# Patient Record
Sex: FEMALE | Race: WHITE | Employment: UNEMPLOYED | URBAN - METROPOLITAN AREA
[De-identification: names, ages, dates, MRNs, and addresses within clinical notes are randomized per-mention and may not be internally consistent; named-entity substitution may affect disease eponyms.]

---

## 2017-03-31 ENCOUNTER — HOSPITAL ENCOUNTER (OUTPATIENT)
Dept: MAMMOGRAPHY | Age: 47
Discharge: HOME | End: 2017-03-31
Attending: FAMILY MEDICINE
Payer: COMMERCIAL

## 2017-03-31 DIAGNOSIS — Z12.31 VISIT FOR SCREENING MAMMOGRAM: ICD-10-CM

## 2017-03-31 PROCEDURE — 77067 SCR MAMMO BI INCL CAD: CPT

## 2017-04-06 ENCOUNTER — HOSPITAL ENCOUNTER (OUTPATIENT)
Dept: MAMMOGRAPHY | Age: 47
Discharge: HOME | End: 2017-04-06
Attending: FAMILY MEDICINE
Payer: COMMERCIAL

## 2017-04-06 DIAGNOSIS — R92.8 ABNORMAL SCREENING MAMMOGRAM: ICD-10-CM

## 2017-04-06 PROCEDURE — 76642 ULTRASOUND BREAST LIMITED: CPT

## 2017-04-06 PROCEDURE — 77066 DX MAMMO INCL CAD BI: CPT

## 2017-04-27 PROBLEM — E11.9 NEW ONSET TYPE 2 DIABETES MELLITUS (HCC): Status: ACTIVE | Noted: 2017-04-27

## 2017-04-28 PROBLEM — I10 SEVERE UNCONTROLLED HYPERTENSION: Status: ACTIVE | Noted: 2017-04-28

## 2017-09-18 PROBLEM — E11.42 DM TYPE 2 WITH DIABETIC PERIPHERAL NEUROPATHY (HCC): Status: ACTIVE | Noted: 2017-09-18

## 2017-09-18 PROBLEM — M17.0 ARTHRITIS OF BOTH KNEES: Status: ACTIVE | Noted: 2017-09-18

## 2017-10-15 ENCOUNTER — HOSPITAL ENCOUNTER (OUTPATIENT)
Dept: SLEEP MEDICINE | Age: 47
Discharge: HOME | End: 2017-10-15
Payer: COMMERCIAL

## 2017-10-15 PROCEDURE — 95810 POLYSOM 6/> YRS 4/> PARAM: CPT

## 2017-12-01 PROBLEM — E66.01 OBESITY, MORBID (HCC): Status: ACTIVE | Noted: 2017-12-01

## 2017-12-01 PROBLEM — E11.9 TYPE 2 DIABETES MELLITUS WITHOUT COMPLICATION, WITHOUT LONG-TERM CURRENT USE OF INSULIN (HCC): Status: ACTIVE | Noted: 2017-12-01

## 2018-01-30 PROBLEM — F33.41 RECURRENT MAJOR DEPRESSIVE DISORDER, IN PARTIAL REMISSION (HCC): Status: ACTIVE | Noted: 2018-01-30

## 2018-02-23 PROBLEM — E11.21 TYPE 2 DIABETES WITH NEPHROPATHY (HCC): Status: ACTIVE | Noted: 2018-02-23

## 2018-08-10 ENCOUNTER — HOSPITAL ENCOUNTER (OUTPATIENT)
Dept: MAMMOGRAPHY | Age: 48
Discharge: HOME | End: 2018-08-10
Attending: FAMILY MEDICINE
Payer: COMMERCIAL

## 2018-08-10 DIAGNOSIS — Z12.31 VISIT FOR SCREENING MAMMOGRAM: ICD-10-CM

## 2018-08-10 PROCEDURE — 77067 SCR MAMMO BI INCL CAD: CPT

## 2019-04-01 ENCOUNTER — HOSPITAL ENCOUNTER (OUTPATIENT)
Dept: SURGERY | Age: 49
Discharge: HOME | End: 2019-04-01

## 2019-04-03 NOTE — PERIOP NOTES
Message left with Dr Zhang's surgery scheduler regarding inability to reach patient for phone assessment.

## 2019-08-12 ENCOUNTER — HOSPITAL ENCOUNTER (OUTPATIENT)
Dept: MAMMOGRAPHY | Age: 49
Discharge: HOME | End: 2019-08-12
Attending: INTERNAL MEDICINE

## 2019-08-12 DIAGNOSIS — Z12.39 SCREENING FOR MALIGNANT NEOPLASM OF BREAST: ICD-10-CM

## 2019-08-12 DIAGNOSIS — Z12.39 BREAST SCREENING, UNSPECIFIED: ICD-10-CM

## 2019-11-24 ENCOUNTER — APPOINTMENT (OUTPATIENT)
Dept: GENERAL RADIOLOGY | Age: 49
End: 2019-11-24
Attending: EMERGENCY MEDICINE
Payer: COMMERCIAL

## 2019-11-24 ENCOUNTER — HOSPITAL ENCOUNTER (EMERGENCY)
Age: 49
Discharge: HOME | End: 2019-11-24
Attending: EMERGENCY MEDICINE
Payer: COMMERCIAL

## 2019-11-24 VITALS
RESPIRATION RATE: 18 BRPM | OXYGEN SATURATION: 98 % | HEART RATE: 117 BPM | DIASTOLIC BLOOD PRESSURE: 85 MMHG | BODY MASS INDEX: 57.52 KG/M2 | SYSTOLIC BLOOD PRESSURE: 127 MMHG | TEMPERATURE: 97.8 F | HEIGHT: 60 IN | WEIGHT: 293 LBS

## 2019-11-24 DIAGNOSIS — S61.411A LACERATION OF RIGHT HAND WITHOUT FOREIGN BODY, INITIAL ENCOUNTER: Primary | ICD-10-CM

## 2019-11-24 PROCEDURE — 77030039266 HC ADH SKN EXOFIN S2SG -A

## 2019-11-24 PROCEDURE — 75810000293 HC SIMP/SUPERF WND  RPR: Performed by: EMERGENCY MEDICINE

## 2019-11-24 PROCEDURE — 73130 X-RAY EXAM OF HAND: CPT

## 2019-11-24 PROCEDURE — 99283 EMERGENCY DEPT VISIT LOW MDM: CPT | Performed by: EMERGENCY MEDICINE
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
## 2019-11-25 NOTE — DISCHARGE INSTRUCTIONS
Return with any fevers, drainage worsening symptoms, or additional concerns. The glue should fall off in about 7-10 days. Do not apply any ointment to the wound until the glue has fallen off; it will make it fall off too soon.

## 2019-11-25 NOTE — ED NOTES
I have reviewed discharge instructions with the patient. The patient verbalized understanding. Patient left ED via Discharge Method: ambulatory to Home with self. Opportunity for questions and clarification provided. Patient given 0 scripts. To continue your aftercare when you leave the hospital, you may receive an automated call from our care team to check in on how you are doing. This is a free service and part of our promise to provide the best care and service to meet your aftercare needs.  If you have questions, or wish to unsubscribe from this service please call [phone]. Thank you for Choosing our [facility] Emergency Department.

## 2020-08-04 PROCEDURE — 88305 TISSUE EXAM BY PATHOLOGIST: CPT

## 2020-08-05 ENCOUNTER — HOSPITAL ENCOUNTER (OUTPATIENT)
Dept: LAB | Age: 50
Discharge: HOME | End: 2020-08-05

## 2020-09-16 ENCOUNTER — HOSPITAL ENCOUNTER (OUTPATIENT)
Dept: MAMMOGRAPHY | Age: 50
Discharge: HOME | End: 2020-09-16
Attending: INTERNAL MEDICINE
Payer: COMMERCIAL

## 2020-09-16 DIAGNOSIS — Z12.31 SCREENING MAMMOGRAM FOR HIGH-RISK PATIENT: ICD-10-CM

## 2020-09-16 PROCEDURE — 77063 BREAST TOMOSYNTHESIS BI: CPT

## 2022-03-19 PROBLEM — E11.9 NEW ONSET TYPE 2 DIABETES MELLITUS (HCC): Status: ACTIVE | Noted: 2017-04-27

## 2022-03-19 PROBLEM — E11.21 TYPE 2 DIABETES WITH NEPHROPATHY (HCC): Status: ACTIVE | Noted: 2018-02-23

## 2022-03-19 PROBLEM — M17.0 ARTHRITIS OF BOTH KNEES: Status: ACTIVE | Noted: 2017-09-18

## 2022-03-19 PROBLEM — E11.42 DM TYPE 2 WITH DIABETIC PERIPHERAL NEUROPATHY (HCC): Status: ACTIVE | Noted: 2017-09-18

## 2022-03-19 PROBLEM — F33.41 RECURRENT MAJOR DEPRESSIVE DISORDER, IN PARTIAL REMISSION (HCC): Status: ACTIVE | Noted: 2018-01-30

## 2022-03-19 PROBLEM — E66.01 OBESITY, MORBID (HCC): Status: ACTIVE | Noted: 2017-12-01

## 2022-03-20 PROBLEM — E11.9 TYPE 2 DIABETES MELLITUS WITHOUT COMPLICATION, WITHOUT LONG-TERM CURRENT USE OF INSULIN (HCC): Status: ACTIVE | Noted: 2017-12-01

## 2022-03-20 PROBLEM — I10 SEVERE UNCONTROLLED HYPERTENSION: Status: ACTIVE | Noted: 2017-04-28

## 2022-06-02 ENCOUNTER — TELEPHONE (OUTPATIENT)
Dept: ORTHOPEDIC SURGERY | Age: 52
End: 2022-06-02

## 2022-06-02 NOTE — TELEPHONE ENCOUNTER
Called pt as I noticed she cancelled her apt. Pt stated her foot is feeling better and she is still wearing her carbon fiber insert. Her main concern right now is her knees and she does have an apt with Dr. [employer] coming up. Kellee Knight

## 2022-06-20 ENCOUNTER — OFFICE VISIT (OUTPATIENT)
Dept: ORTHOPEDIC SURGERY | Age: 52
End: 2022-06-20
Payer: COMMERCIAL

## 2022-06-20 VITALS — BODY MASS INDEX: 59.07 KG/M2 | HEIGHT: 59 IN | WEIGHT: 293 LBS

## 2022-06-20 DIAGNOSIS — E66.01 MORBID OBESITY (HCC): ICD-10-CM

## 2022-06-20 DIAGNOSIS — M17.12 PRIMARY OSTEOARTHRITIS OF LEFT KNEE: Primary | ICD-10-CM

## 2022-06-20 PROCEDURE — 99214 OFFICE O/P EST MOD 30 MIN: CPT | Performed by: ORTHOPAEDIC SURGERY

## 2022-06-20 PROCEDURE — 20610 DRAIN/INJ JOINT/BURSA W/O US: CPT | Performed by: ORTHOPAEDIC SURGERY

## 2022-06-20 RX ORDER — METHYLPREDNISOLONE ACETATE 40 MG/ML
40 INJECTION, SUSPENSION INTRA-ARTICULAR; INTRALESIONAL; INTRAMUSCULAR; SOFT TISSUE ONCE
Status: COMPLETED | OUTPATIENT
Start: 2022-06-20 | End: 2022-06-20

## 2022-06-20 RX ORDER — DICLOFENAC SODIUM 75 MG/1
75 TABLET, DELAYED RELEASE ORAL 2 TIMES DAILY
Qty: 60 TABLET | Refills: 0 | Status: SHIPPED | OUTPATIENT
Start: 2022-06-20

## 2022-06-20 RX ADMIN — METHYLPREDNISOLONE ACETATE 40 MG: 40 INJECTION, SUSPENSION INTRA-ARTICULAR; INTRALESIONAL; INTRAMUSCULAR; SOFT TISSUE at 09:59

## 2022-06-20 NOTE — PROGRESS NOTES
Name: Lynnea Buerger  YOB: 1970  Gender: female  MRN: [id number]    CC: Left knee pain    HPI: Lynnea Buerger is a 46 y.o. female who presents with left knee pain. She has been seen in our practice for her foot and those notes were reviewed. She describes a episode in February in which she fell twisting her left knee and injuring her left foot. She did see Dr. Chet Junior at [facility] clinic had a diagnosis of a foot injury but also had knee injections recommended. She did not proceed with those having a desire to be followed in our practice. She did see Dr. Ave Jefferson for her foot and subsequently was scheduled to see me for her left knee pain. She does have a diagnosis of super morbid obesity and ambulates using a Rollator walker or with a cane most of the time. She states that in her home she can ambulate for short distances without any assistive device. She has had no recent injections in either knee and essentially manages with her pain under the care of her primary care provider. She has not looked for an orthopedic evaluation until the February episode. She has had no previous injections by her report. History was obtained from patient and spouse    ROS/Meds/PSH/PMH/FH/SH: I personally reviewed the patients standard intake form. Below are the pertinents    Tobacco:  reports that she has never smoked.  She has never used smokeless tobacco.  Past Medical History:   Diagnosis Date    Arthritis     knees osteo    HTN (hypertension) 2014    Hypertension     some what controlled by medication    Morbid obesity ([address]     BMI70.6    MRSA (methicillin resistant staph aureus) culture positive     New onset type 2 diabetes mellitus ([address] 2017    Recurrent major depressive disorder, in partial remission ([address] 2018    SOB (shortness of breath) on exertion       Past Surgical History:   Procedure Laterality Date     SECTION      ORTHOPEDIC SURGERY 2007    bone spur L foot    OTHER SURGICAL HISTORY      Right axilla I&D which tested positive for MRSA    TONSILLECTOMY  as a child    TUBAL LIGATION  2002        Physical Examination:  Physical exam: On examination of the patient's gait there is Significant antalgia in stance but she is able to arise from a seated position without assistance. She does come into the office today using one of our extra-large wheelchairs. Her BMI is measured to be over 74 today. On examination while standing there is decreased flexion in the lumbosacral spine without acute list or spasm. While seated ,  the Bilateral hip is examined there is full range of motion without obvious issue . On examination of the  Left knee :ROM is 15 to 100 There is significant tenderness to direct palpation, There is no obvious effusion and The extensor mechanism is intact. . On examination of the  Right knee : There is no obvious effusion. Lower extremities have a size and appearance consistent with her BMI. She does carry her weight diffusely in her thighs calves are commiserate with her BMI and body habitus. Patient is neurologically intact distally. Skin is intact. Imaging:   AP and lateral of the left knee reviewed independently by me having obtained elsewhere. These demonstrate relatively significant in joint disease in the knee without acute findings. There is proximal osteophyte formation. Assessment:   Degenerative joint disease of the left Knee. This is an exacerbation following a twisting episode in a super morbidly obese patient. I did discuss with the patient the source of the pain and treatment options. We discussed nonsurgical measures including:Injection therapy, Weight Loss and Use of assistive device. I counseled the patient regarding the difference between various injection therapies.   I explained the role of steroid injections and focused on the use of steroids for more acute issues and flareups times daily, Disp: , Rfl:     dilTIAZem (TIAZAC) 240 MG extended release capsule, TAKE ONE CAPSULE BY MOUTH EVERY DAY, Disp: , Rfl:     glimepiride (AMARYL) 2 MG tablet, Take 2 mg by mouth, Disp: , Rfl:     HYDROcodone-acetaminophen (NORCO) 7.5-325 MG per tablet, Take 1 tablet by mouth every 6 hours as needed. , Disp: , Rfl:     Lidocaine, Anorectal, 5 % CREA, Actual prescription: Lidocaine 5%: Neurontin/gabapentin 5% combined topical cream/gelApply to affected area up to 4 times a day as needed for pain, Disp: , Rfl:     lisinopril-hydroCHLOROthiazide (PRINZIDE;ZESTORETIC) 20-12.5 MG per tablet, Take 1 Tab by mouth two (2) times a day., Disp: , Rfl:     metFORMIN (GLUCOPHAGE-XR) 500 MG extended release tablet, Take 500 mg by mouth 2 times daily, Disp: , Rfl:     pioglitazone (ACTOS) 30 MG tablet, Take 30 mg by mouth daily, Disp: , Rfl:     traMADol (ULTRAM) 50 MG tablet, Take 50 mg by mouth every 8 hours as needed. , Disp: , Rfl:   Allergies   Allergen Reactions    Penicillins Anaphylaxis     \"Tolerates ampicillin and keflex without problems\"    Sulfa Antibiotics Swelling       CC:left knee pain    Impression: Osteoarthritis of the left knee    Procedure: Depomedrol injection     Procedure Note: The left knee was prepped with alcohol. Then the left knee was injected with 1 mL of 0.5% Marcaine and 40mg of Depo-Medrol. The patient tolerated the procedure without difficulty.       Yuliet Walsh MD

## 2022-07-11 ENCOUNTER — OFFICE VISIT (OUTPATIENT)
Dept: ORTHOPEDIC SURGERY | Age: 52
End: 2022-07-11
Payer: COMMERCIAL

## 2022-07-11 DIAGNOSIS — M25.561 ACUTE PAIN OF RIGHT KNEE: ICD-10-CM

## 2022-07-11 DIAGNOSIS — M17.11 PRIMARY OSTEOARTHRITIS OF RIGHT KNEE: ICD-10-CM

## 2022-07-11 DIAGNOSIS — M17.12 PRIMARY OSTEOARTHRITIS OF LEFT KNEE: Primary | ICD-10-CM

## 2022-07-11 PROCEDURE — 20611 DRAIN/INJ JOINT/BURSA W/US: CPT | Performed by: PHYSICIAN ASSISTANT

## 2022-07-11 RX ORDER — HYALURONATE SODIUM 10 MG/ML
20 SYRINGE (ML) INTRAARTICULAR ONCE
Status: COMPLETED | OUTPATIENT
Start: 2022-07-11 | End: 2022-07-11

## 2022-07-11 RX ADMIN — Medication 20 MG: at 15:38

## 2022-07-11 RX ADMIN — Medication 20 MG: at 15:37

## 2022-07-11 NOTE — PROGRESS NOTES
Name: Morales Keating  YOB: 1970  Gender: female  MRN: [id number]     CC: BILATERAL Knee Pain     PROCEDURE: #1 of 3 Hyaluronic Injection    After discussion of risks and benefits including but not limited to pain, infection, skin discoloration, and injury to blood vessels or nerves the patient verbally consented to proceed with injection of the BILATERAL. The patient is to restrict their activity for 48 hours. Radiology Report: US guidance was used to examine the joint, ensure adequate needle placement and to decrease the risk of joint aggravation. The intracondylar notch, retropatellar fat pad, patella tendon, patella and tibia were all visualized. Pre and post injection US still images were obtained and placed in the record. Image were obtained with a [employer] ultrasound transducer (model [id number]). Procedure Note: After steriley prepping the BILATERAL knee, it was injected with a 2mL of Euflexxa and the medication was observed going into the intra-articular space via US guidance. The patient tolerated the procedure without difficulty.     CARLOS Sorensen

## 2022-07-18 ENCOUNTER — OFFICE VISIT (OUTPATIENT)
Dept: ORTHOPEDIC SURGERY | Age: 52
End: 2022-07-18
Payer: COMMERCIAL

## 2022-07-18 DIAGNOSIS — M17.12 PRIMARY OSTEOARTHRITIS OF LEFT KNEE: ICD-10-CM

## 2022-07-18 DIAGNOSIS — M17.11 PRIMARY OSTEOARTHRITIS OF RIGHT KNEE: Primary | ICD-10-CM

## 2022-07-18 PROCEDURE — 20611 DRAIN/INJ JOINT/BURSA W/US: CPT | Performed by: PHYSICIAN ASSISTANT

## 2022-07-18 RX ORDER — HYALURONATE SODIUM 10 MG/ML
20 SYRINGE (ML) INTRAARTICULAR ONCE
Status: COMPLETED | OUTPATIENT
Start: 2022-07-18 | End: 2022-07-18

## 2022-07-18 RX ADMIN — Medication 20 MG: at 13:42

## 2022-07-18 RX ADMIN — Medication 20 MG: at 13:43

## 2022-07-18 NOTE — PROGRESS NOTES
Name: Mihir Jackson  YOB: 1970  Gender: female  MRN: [id number]     CC: BILATERAL Knee Pain     PROCEDURE: #2 of 3 Hyaluronic Injection    After discussion of risks and benefits including but not limited to pain, infection, skin discoloration, and injury to blood vessels or nerves the patient verbally consented to proceed with injection of the BILATERAL. The patient is to restrict their activity for 48 hours. Radiology Report: US guidance was used to examine the joint, ensure adequate needle placement and to decrease the risk of joint aggravation. The intracondylar notch, retropatellar fat pad, patella tendon, patella and tibia were all visualized. Pre and post injection US still images were obtained and placed in the record. Image were obtained with a [employer] ultrasound transducer (model [id number]). Procedure Note: After steriley prepping the BILATERAL knee, it was injected with a 2mL of Euflexxa and the medication was observed going into the intra-articular space via US guidance. The patient tolerated the procedure without difficulty.     CARLOS Driscoll

## 2022-07-25 ENCOUNTER — OFFICE VISIT (OUTPATIENT)
Dept: ORTHOPEDIC SURGERY | Age: 52
End: 2022-07-25
Payer: COMMERCIAL

## 2022-07-25 DIAGNOSIS — M17.11 PRIMARY OSTEOARTHRITIS OF RIGHT KNEE: ICD-10-CM

## 2022-07-25 DIAGNOSIS — M17.12 PRIMARY OSTEOARTHRITIS OF LEFT KNEE: Primary | ICD-10-CM

## 2022-07-25 PROCEDURE — 20611 DRAIN/INJ JOINT/BURSA W/US: CPT | Performed by: PHYSICIAN ASSISTANT

## 2022-07-25 RX ORDER — HYALURONATE SODIUM 10 MG/ML
20 SYRINGE (ML) INTRAARTICULAR ONCE
Status: COMPLETED | OUTPATIENT
Start: 2022-07-25 | End: 2022-07-25

## 2022-07-25 RX ADMIN — Medication 20 MG: at 14:53

## 2022-07-25 NOTE — PROGRESS NOTES
Name: Eva Ortez  YOB: 1970  Gender: female  MRN: [id number]     CC: BILATERAL Knee Pain     PROCEDURE: #3 of 3 Hyaluronic Injection    After discussion of risks and benefits including but not limited to pain, infection, skin discoloration, and injury to blood vessels or nerves the patient verbally consented to proceed with injection of the BILATERAL. The patient is to restrict their activity for 48 hours. Radiology Report: US guidance was used to examine the joint, ensure adequate needle placement and to decrease the risk of joint aggravation. The intracondylar notch, retropatellar fat pad, patella tendon, patella and tibia were all visualized. Pre and post injection US still images were obtained and placed in the record. Image were obtained with a [employer] ultrasound transducer (model [id number]). Procedure Note: After steriley prepping the BILATERAL knee, it was injected with a 2mL of Euflexxa and the medication was observed going into the intra-articular space via US guidance. The patient tolerated the procedure without difficulty.     CARLOS Dhillon

## 2022-08-23 DIAGNOSIS — M17.12 PRIMARY OSTEOARTHRITIS OF LEFT KNEE: Primary | ICD-10-CM

## 2022-08-23 NOTE — TELEPHONE ENCOUNTER
She is wondering if she can get a refill of her Voltaren tablets. She started a new job yesterday and it tough on her joints. Please call.

## 2022-08-24 RX ORDER — DICLOFENAC SODIUM 75 MG/1
75 TABLET, DELAYED RELEASE ORAL 2 TIMES DAILY
Qty: 60 TABLET | Refills: 0 | Status: SHIPPED | OUTPATIENT
Start: 2022-08-24

## 2022-09-21 ENCOUNTER — OFFICE VISIT (OUTPATIENT)
Dept: ORTHOPEDIC SURGERY | Age: 52
End: 2022-09-21
Payer: COMMERCIAL

## 2022-09-21 DIAGNOSIS — M17.12 PRIMARY OSTEOARTHRITIS OF LEFT KNEE: ICD-10-CM

## 2022-09-21 DIAGNOSIS — M25.562 ACUTE PAIN OF BOTH KNEES: Primary | ICD-10-CM

## 2022-09-21 DIAGNOSIS — M17.11 PRIMARY OSTEOARTHRITIS OF RIGHT KNEE: ICD-10-CM

## 2022-09-21 DIAGNOSIS — M25.561 ACUTE PAIN OF BOTH KNEES: Primary | ICD-10-CM

## 2022-09-21 PROCEDURE — 99213 OFFICE O/P EST LOW 20 MIN: CPT | Performed by: PHYSICIAN ASSISTANT

## 2022-09-21 PROCEDURE — 20611 DRAIN/INJ JOINT/BURSA W/US: CPT | Performed by: PHYSICIAN ASSISTANT

## 2022-09-21 RX ORDER — METHYLPREDNISOLONE ACETATE 40 MG/ML
40 INJECTION, SUSPENSION INTRA-ARTICULAR; INTRALESIONAL; INTRAMUSCULAR; SOFT TISSUE ONCE
Status: COMPLETED | OUTPATIENT
Start: 2022-09-21 | End: 2022-09-21

## 2022-09-21 RX ADMIN — METHYLPREDNISOLONE ACETATE 40 MG: 40 INJECTION, SUSPENSION INTRA-ARTICULAR; INTRALESIONAL; INTRAMUSCULAR; SOFT TISSUE at 11:38

## 2022-09-21 NOTE — PROGRESS NOTES
Name: Victoriano Ambrocio  YOB: 1970  Gender: female  MRN: [id number]      Chief complaint:  BILATERAL knee pain    History of Present Illness:     Victoriano Ambrocio is a 46 y.o. female  The pain has been present for about 2 weeks following a fall. She is fortunately able to bear weight on both lower extremities. She uses a rolling walker for ambulation. They state the pain is located anterior, medial, and lateral.  The patient describes the quality of the pain as a deep ache. The symptoms are exacerbated by weight bearing, walking and standing for long periods of time. The pain is also exacerbated by ascending and descending stairs, getting up and down from a chair. They deny any previous surgery on the knee. Previous treatment includes anti-inflammatories, use of cane/walker and activity modification. Past Medical History: Allergies: Allergies   Allergen Reactions    Penicillins Anaphylaxis     \"Tolerates ampicillin and keflex without problems\"    Sulfa Antibiotics Swelling       Medications:  Current Outpatient Medications   Medication Sig    diclofenac (VOLTAREN) 75 MG EC tablet Take 1 tablet by mouth 2 times daily    diclofenac (VOLTAREN) 75 MG EC tablet Take 1 tablet by mouth 2 times daily    aspirin 81 MG EC tablet Take by mouth daily    buPROPion (WELLBUTRIN XL) 300 MG extended release tablet Take 300 mg by mouth    diclofenac sodium (VOLTAREN) 1 % GEL Apply 4 g topically 4 times daily    dilTIAZem (TIAZAC) 240 MG extended release capsule TAKE ONE CAPSULE BY MOUTH EVERY DAY    glimepiride (AMARYL) 2 MG tablet Take 2 mg by mouth    HYDROcodone-acetaminophen (NORCO) 7.5-325 MG per tablet Take 1 tablet by mouth every 6 hours as needed.     Lidocaine, Anorectal, 5 % CREA Actual prescription: Lidocaine 5%: Neurontin/gabapentin 5% combined topical cream/gelApply to affected area up to 4 times a day as needed for pain    lisinopril-hydroCHLOROthiazide (PRINZIDE;ZESTORETIC) 20-12.5 MG per tablet Take 1 Tab by mouth two (2) times a day. metFORMIN (GLUCOPHAGE-XR) 500 MG extended release tablet Take 500 mg by mouth 2 times daily    pioglitazone (ACTOS) 30 MG tablet Take 30 mg by mouth daily    traMADol (ULTRAM) 50 MG tablet Take 50 mg by mouth every 8 hours as needed. No current facility-administered medications for this visit. Past Medical history:  Past Medical History:   Diagnosis Date    Arthritis     knees osteo    HTN (hypertension) 2014    Hypertension     some what controlled by medication    Morbid obesity ([address]     BMI70.6    MRSA (methicillin resistant staph aureus) culture positive     New onset type 2 diabetes mellitus ([facility] [address] 2017    Recurrent major depressive disorder, in partial remission ([facility] [address] 2018    SOB (shortness of breath) on exertion         has a past surgical history that includes Tonsillectomy (as a child); Tubal ligation ();  section (); other surgical history; and orthopedic surgery (). Family History:  [unfilled]     Social History:  [unfilled]    Review of Systems:       Physical Exam:    General:   Alert and oriented    Gait:          Normal gait with the use of a rolling walker    Back:        No tenderness over lower back. BILATERAL Hip:    Skin is intact. No pain with palpation over the greater trochanter. No groin pain and restricted ROM of the hip     BILATERAL Knee: Skin: normal                    Effusion: no                    Tenderness to palpation: none                    Patella grind test: no                    Stability:  Valgus: yes Varus: yes AP: yes                    Cali's test: negative                    Quad Strength: good                    ROM   Extension: 15 Flexion: 100                    Popliteal cyst : no                    Calf pain : no                    Edema left leg: none noted    Neurovascular:  Patient has good pulses distally.  They have intact motor and sensory function. X-rays of the BILATERAL knee:  AP, lateral, merchant and Skiers shows: Severe knee DJD with bone-on-bone formation. Diagnoses: Severe bilateral knee DJD with recent fall    Plan:    She is counseled her x-rays do not show any acute fracture. She does have quite severe DJD of both knees. Unfortunately her weight prevents her from proceeding with definitive option of TKA surgery. She was counseled about this yet again and that weight loss is imperative to pursue any form of treatment for the knees in terms of surgery. We did pursue the cortisone injection in the right knee to help relieve her symptoms there. She is diabetic and only could pursue 1 shot of cortisone today. She did get some benefit from viscosupplementation in the past and so we may be to repeat those injections in about 4 months or so. Otherwise she will return to see us on an as-needed basis. Procedure: Depomedrol(US) injection with US guidance    Radiology Report: Ultrasound guidance was used to examine the joint, ensure adequate needle placement and to decrease the risk of joint aggravation. The intercondylar notch, retropatellar fat pad, patella tendon, patella and tibia were all visualized. Pre and post injection ultrasound still images were obtained and placed in the record. Image were obtained with a [employer] ultrasound transducer (model [id number]). Procedure Note: The LEFT knee was prepped with alcohol. Then, under ultrasound guidance, the knee was injected with 1 mL of 0.5% Marcaine and 40mg of Depo-Medrol. The patient tolerated the procedure without difficulty. Disposition:  They are to return as needed    Tiffanie Reynolds

## 2022-10-04 ENCOUNTER — TELEPHONE (OUTPATIENT)
Dept: ORTHOPEDIC SURGERY | Age: 52
End: 2022-10-04

## 2022-12-02 ENCOUNTER — TELEPHONE (OUTPATIENT)
Dept: ORTHOPEDIC SURGERY | Age: 52
End: 2022-12-02

## 2022-12-02 NOTE — TELEPHONE ENCOUNTER
Spoke with patient and advised her to call the appointment line to make an appt that will fit her scheduling needs. However, we cannot work her in on 12/14 as she requested.

## 2022-12-06 ENCOUNTER — TELEPHONE (OUTPATIENT)
Dept: ORTHOPEDIC SURGERY | Age: 52
End: 2022-12-06

## 2022-12-06 DIAGNOSIS — M17.11 PRIMARY OSTEOARTHRITIS OF RIGHT KNEE: ICD-10-CM

## 2022-12-06 DIAGNOSIS — M17.12 PRIMARY OSTEOARTHRITIS OF LEFT KNEE: Primary | ICD-10-CM

## 2023-01-16 ENCOUNTER — OFFICE VISIT (OUTPATIENT)
Dept: ORTHOPEDIC SURGERY | Age: 53
End: 2023-01-16

## 2023-01-16 DIAGNOSIS — M17.11 PRIMARY OSTEOARTHRITIS OF RIGHT KNEE: ICD-10-CM

## 2023-01-16 DIAGNOSIS — M17.12 PRIMARY OSTEOARTHRITIS OF LEFT KNEE: Primary | ICD-10-CM

## 2023-01-16 RX ORDER — HYALURONATE SODIUM 10 MG/ML
20 SYRINGE (ML) INTRAARTICULAR ONCE
Status: COMPLETED | OUTPATIENT
Start: 2023-01-16 | End: 2023-01-16

## 2023-01-16 RX ADMIN — Medication 20 MG: at 13:06

## 2023-01-16 RX ADMIN — Medication 20 MG: at 13:05

## 2023-01-16 NOTE — PROGRESS NOTES
Name: Manisha Swain  YOB: 1970  Gender: female  MRN: [id number]     CC: BILATERAL Knee Osteoarthritis      PROCEDURE: #1 of 3 Hyaluronic Injection    After discussion of risks and benefits including but not limited to pain, infection, skin discoloration, and injury to blood vessels or nerves the patient verbally consented to proceed with injection of the BILATERAL. The patient is to restrict their activity for 48 hours. Radiology Report: US guidance was used to examine the joint, ensure adequate needle placement and to decrease the risk of joint aggravation. The intracondylar notch, retropatellar fat pad, patella tendon, patella and tibia were all visualized. Pre and post injection US still images were obtained and placed in the record. Image were obtained with a [employer] ultrasound transducer (model [id number]). Procedure Note: After steriley prepping the BILATERAL knee, it was injected with a 2mL of Euflexxa and the medication was observed going into the intra-articular space via US guidance. The patient tolerated the procedure without difficulty.     CARLOS Romero

## 2023-01-16 NOTE — PROGRESS NOTES
Patient was fitted and instructed on use of walker with wheels. I demonstrated the correct way to lock and unlock the walker. The walker was adjusted to the patient's height and arm length. Patient walked around with the walker to insure it was set at a comfortable height. Patient read and signed documenting they understand and agree to [facility]'s current DME return policy.

## 2023-01-23 ENCOUNTER — OFFICE VISIT (OUTPATIENT)
Dept: ORTHOPEDIC SURGERY | Age: 53
End: 2023-01-23

## 2023-01-23 DIAGNOSIS — M17.11 PRIMARY OSTEOARTHRITIS OF RIGHT KNEE: ICD-10-CM

## 2023-01-23 DIAGNOSIS — M17.12 PRIMARY OSTEOARTHRITIS OF LEFT KNEE: Primary | ICD-10-CM

## 2023-01-23 RX ORDER — HYALURONATE SODIUM 10 MG/ML
20 SYRINGE (ML) INTRAARTICULAR ONCE
Status: COMPLETED | OUTPATIENT
Start: 2023-01-23 | End: 2023-01-23

## 2023-01-23 RX ADMIN — Medication 20 MG: at 13:11

## 2023-01-23 NOTE — PROGRESS NOTES
Name: Lazara Chisholm  YOB: 1970  Gender: female  MRN: [id number]     CC: BILATERAL Knee Osteoarthritis      PROCEDURE: #2 of 3 Hyaluronic Injection    After discussion of risks and benefits including but not limited to pain, infection, skin discoloration, and injury to blood vessels or nerves the patient verbally consented to proceed with injection of the BILATERAL. The patient is to restrict their activity for 48 hours. Radiology Report: US guidance was used to examine the joint, ensure adequate needle placement and to decrease the risk of joint aggravation. The intracondylar notch, retropatellar fat pad, patella tendon, patella and tibia were all visualized. Pre and post injection US still images were obtained and placed in the record. Image were obtained with a [employer] ultrasound transducer (model [id number]). Procedure Note: After steriley prepping the BILATERAL knee, it was injected with a 2mL of Euflexxa and the medication was observed going into the intra-articular space via US guidance. The patient tolerated the procedure without difficulty.     CARLOS Martin

## 2023-01-30 ENCOUNTER — OFFICE VISIT (OUTPATIENT)
Dept: ORTHOPEDIC SURGERY | Age: 53
End: 2023-01-30
Payer: COMMERCIAL

## 2023-01-30 DIAGNOSIS — M17.11 PRIMARY OSTEOARTHRITIS OF RIGHT KNEE: ICD-10-CM

## 2023-01-30 DIAGNOSIS — M17.12 PRIMARY OSTEOARTHRITIS OF LEFT KNEE: Primary | ICD-10-CM

## 2023-01-30 PROCEDURE — 20611 DRAIN/INJ JOINT/BURSA W/US: CPT | Performed by: PHYSICIAN ASSISTANT

## 2023-01-30 RX ORDER — HYALURONATE SODIUM 10 MG/ML
20 SYRINGE (ML) INTRAARTICULAR ONCE
Status: COMPLETED | OUTPATIENT
Start: 2023-01-30 | End: 2023-01-30

## 2023-01-30 RX ADMIN — Medication 20 MG: at 08:43

## 2023-01-30 RX ADMIN — Medication 20 MG: at 08:44

## 2023-01-30 NOTE — PROGRESS NOTES
Name: Tita Anglin  YOB: 1970  Gender: female  MRN: [id number]     CC: BILATERAL Knee Osteoarthritis      PROCEDURE: #3 of 3 Hyaluronic Injection    After discussion of risks and benefits including but not limited to pain, infection, skin discoloration, and injury to blood vessels or nerves the patient verbally consented to proceed with injection of the BILATERAL. The patient is to restrict their activity for 48 hours. Radiology Report: US guidance was used to examine the joint, ensure adequate needle placement and to decrease the risk of joint aggravation. The intracondylar notch, retropatellar fat pad, patella tendon, patella and tibia were all visualized. Pre and post injection US still images were obtained and placed in the record. Image were obtained with a [employer] ultrasound transducer (model [id number]). Procedure Note: After steriley prepping the BILATERAL knee, it was injected with a 2mL of Euflexxa and the medication was observed going into the intra-articular space via US guidance. The patient tolerated the procedure without difficulty.     CARLOS Brown

## 2023-02-28 ENCOUNTER — TELEPHONE (OUTPATIENT)
Dept: ORTHOPEDIC SURGERY | Age: 53
End: 2023-02-28

## 2023-02-28 NOTE — TELEPHONE ENCOUNTER
Pt wants to theresa an inj I tried to theresa her on 3/10 she wants to know if she can come in tomorrow afternoon please advise

## 2023-02-28 NOTE — TELEPHONE ENCOUNTER
Spoke with blanca and told her she has to wait until on/after 4/30 to have cortisone since her last gel injection was on 1/30.

## 2023-06-05 ENCOUNTER — OFFICE VISIT (OUTPATIENT)
Dept: ORTHOPEDIC SURGERY | Age: 53
End: 2023-06-05
Payer: COMMERCIAL

## 2023-06-05 DIAGNOSIS — M17.12 PRIMARY OSTEOARTHRITIS OF LEFT KNEE: Primary | ICD-10-CM

## 2023-06-05 DIAGNOSIS — M17.11 PRIMARY OSTEOARTHRITIS OF RIGHT KNEE: ICD-10-CM

## 2023-06-05 PROCEDURE — 20611 DRAIN/INJ JOINT/BURSA W/US: CPT | Performed by: PHYSICIAN ASSISTANT

## 2023-06-05 RX ORDER — METHYLPREDNISOLONE ACETATE 40 MG/ML
40 INJECTION, SUSPENSION INTRA-ARTICULAR; INTRALESIONAL; INTRAMUSCULAR; SOFT TISSUE ONCE
Status: COMPLETED | OUTPATIENT
Start: 2023-06-05 | End: 2023-06-05

## 2023-06-05 RX ADMIN — METHYLPREDNISOLONE ACETATE 40 MG: 40 INJECTION, SUSPENSION INTRA-ARTICULAR; INTRALESIONAL; INTRAMUSCULAR; SOFT TISSUE at 08:42

## 2023-06-05 NOTE — PROGRESS NOTES
Name: Abbie Duron  YOB: 1970  Gender: female  MRN: [id number]        Current Outpatient Medications:     diclofenac (VOLTAREN) 75 MG EC tablet, Take 1 tablet by mouth 2 times daily, Disp: 60 tablet, Rfl: 0    diclofenac (VOLTAREN) 75 MG EC tablet, Take 1 tablet by mouth 2 times daily, Disp: 60 tablet, Rfl: 0    aspirin 81 MG EC tablet, Take by mouth daily, Disp: , Rfl:     buPROPion (WELLBUTRIN XL) 300 MG extended release tablet, Take 300 mg by mouth, Disp: , Rfl:     diclofenac sodium (VOLTAREN) 1 % GEL, Apply 4 g topically 4 times daily, Disp: , Rfl:     dilTIAZem (TIAZAC) 240 MG extended release capsule, TAKE ONE CAPSULE BY MOUTH EVERY DAY, Disp: , Rfl:     glimepiride (AMARYL) 2 MG tablet, Take 2 mg by mouth, Disp: , Rfl:     HYDROcodone-acetaminophen (NORCO) 7.5-325 MG per tablet, Take 1 tablet by mouth every 6 hours as needed. , Disp: , Rfl:     Lidocaine, Anorectal, 5 % CREA, Actual prescription: Lidocaine 5%: Neurontin/gabapentin 5% combined topical cream/gelApply to affected area up to 4 times a day as needed for pain, Disp: , Rfl:     lisinopril-hydroCHLOROthiazide (PRINZIDE;ZESTORETIC) 20-12.5 MG per tablet, Take 1 Tab by mouth two (2) times a day., Disp: , Rfl:     metFORMIN (GLUCOPHAGE-XR) 500 MG extended release tablet, Take 500 mg by mouth 2 times daily, Disp: , Rfl:     pioglitazone (ACTOS) 30 MG tablet, Take 30 mg by mouth daily, Disp: , Rfl:     traMADol (ULTRAM) 50 MG tablet, Take 50 mg by mouth every 8 hours as needed. , Disp: , Rfl:   Allergies   Allergen Reactions    Penicillins Anaphylaxis     \"Tolerates ampicillin and keflex without problems\"    Sulfa Antibiotics Swelling       CC: BILATERALknee pain    Impression: BILATERAL Knee DJD    Procedure: Depomedrol(US) injection with US guidance    Radiology Report: Ultrasound guidance was used to examine the joint, ensure adequate needle placement and to decrease the risk of joint aggravation.   The intercondylar notch,

## 2023-07-19 ENCOUNTER — TELEPHONE (OUTPATIENT)
Dept: ORTHOPEDIC SURGERY | Age: 53
End: 2023-07-19

## 2023-07-19 NOTE — TELEPHONE ENCOUNTER
She just missed a call and said there isn't a voicemail. She is on her lunch break right now if you could call her soon.

## 2023-07-19 NOTE — TELEPHONE ENCOUNTER
Pt called to speak to MA. She wants to know if there is any prescription she can take since she cant get another injection until September. Please call pt.

## 2023-09-18 ENCOUNTER — OFFICE VISIT (OUTPATIENT)
Dept: ORTHOPEDIC SURGERY | Age: 53
End: 2023-09-18
Payer: COMMERCIAL

## 2023-09-18 DIAGNOSIS — M17.12 PRIMARY OSTEOARTHRITIS OF LEFT KNEE: Primary | ICD-10-CM

## 2023-09-18 DIAGNOSIS — M17.11 PRIMARY OSTEOARTHRITIS OF RIGHT KNEE: ICD-10-CM

## 2023-09-18 PROCEDURE — 20611 DRAIN/INJ JOINT/BURSA W/US: CPT | Performed by: PHYSICIAN ASSISTANT

## 2023-09-18 NOTE — PROGRESS NOTES
retropatellar fat pad, patella tendon, patella and tibia were all visualized. Pre and post injection ultrasound still images were obtained and placed in the record. Image were obtained with a [employer] ultrasound transducer (model [id number]). Procedure Note: The BILATERAL knee was prepped with alcohol. Then, under ultrasound guidance, the knee was injected with 1 mL of 0.5% Marcaine and 40mg of Depo-Medrol. The patient tolerated the procedure without difficulty. Disposition:  They are to return as needed    Cassie Bernstein

## 2023-09-24 ENCOUNTER — HOSPITAL ENCOUNTER (EMERGENCY)
Age: 53
Discharge: HOME | End: 2023-09-24
Attending: GENERAL PRACTICE
Payer: COMMERCIAL

## 2023-09-24 ENCOUNTER — APPOINTMENT (OUTPATIENT)
Dept: CT IMAGING | Age: 53
End: 2023-09-24
Payer: COMMERCIAL

## 2023-09-24 VITALS
BODY MASS INDEX: 57.52 KG/M2 | RESPIRATION RATE: 16 BRPM | DIASTOLIC BLOOD PRESSURE: 94 MMHG | OXYGEN SATURATION: 96 % | SYSTOLIC BLOOD PRESSURE: 151 MMHG | HEIGHT: 60 IN | WEIGHT: 293 LBS | TEMPERATURE: 97.9 F | HEART RATE: 77 BPM

## 2023-09-24 DIAGNOSIS — R42 VERTIGO: Primary | ICD-10-CM

## 2023-09-24 DIAGNOSIS — G44.209 ACUTE NON INTRACTABLE TENSION-TYPE HEADACHE: ICD-10-CM

## 2023-09-24 PROCEDURE — 6370000000 HC RX 637 (ALT 250 FOR IP)

## 2023-09-24 PROCEDURE — 99284 EMERGENCY DEPT VISIT MOD MDM: CPT

## 2023-09-24 PROCEDURE — 70450 CT HEAD/BRAIN W/O DYE: CPT

## 2023-09-24 RX ORDER — ONDANSETRON 4 MG/1
4 TABLET, ORALLY DISINTEGRATING ORAL
Status: COMPLETED | OUTPATIENT
Start: 2023-09-24 | End: 2023-09-24

## 2023-09-24 RX ORDER — LORAZEPAM 1 MG/1
1 TABLET ORAL EVERY 4 HOURS PRN
Status: DISCONTINUED | OUTPATIENT
Start: 2023-09-24 | End: 2023-09-24 | Stop reason: HOSPADM

## 2023-09-24 RX ORDER — IBUPROFEN 800 MG/1
800 TABLET ORAL
Status: COMPLETED | OUTPATIENT
Start: 2023-09-24 | End: 2023-09-24

## 2023-09-24 RX ORDER — MECLIZINE HYDROCHLORIDE 25 MG/1
25 TABLET ORAL 3 TIMES DAILY PRN
Qty: 15 TABLET | Refills: 0 | Status: SHIPPED | OUTPATIENT
Start: 2023-09-24 | End: 2023-10-04

## 2023-09-24 RX ORDER — ONDANSETRON 4 MG/1
4 TABLET, FILM COATED ORAL EVERY 8 HOURS PRN
Qty: 30 TABLET | Refills: 0 | Status: SHIPPED | OUTPATIENT
Start: 2023-09-24

## 2023-09-24 RX ORDER — MECLIZINE HYDROCHLORIDE 25 MG/1
25 TABLET ORAL
Status: COMPLETED | OUTPATIENT
Start: 2023-09-24 | End: 2023-09-24

## 2023-09-24 RX ADMIN — LORAZEPAM 1 MG: 1 TABLET ORAL at 20:26

## 2023-09-24 RX ADMIN — ONDANSETRON 4 MG: 4 TABLET, ORALLY DISINTEGRATING ORAL at 19:30

## 2023-09-24 RX ADMIN — IBUPROFEN 800 MG: 800 TABLET ORAL at 19:30

## 2023-09-24 RX ADMIN — MECLIZINE HYDROCHLORIDE 25 MG: 25 TABLET ORAL at 19:30

## 2023-09-24 ASSESSMENT — LIFESTYLE VARIABLES
HOW OFTEN DO YOU HAVE A DRINK CONTAINING ALCOHOL: NEVER
HOW MANY STANDARD DRINKS CONTAINING ALCOHOL DO YOU HAVE ON A TYPICAL DAY: PATIENT DOES NOT DRINK

## 2023-09-24 ASSESSMENT — PAIN DESCRIPTION - DESCRIPTORS: DESCRIPTORS: ACHING

## 2023-09-24 ASSESSMENT — PAIN DESCRIPTION - LOCATION: LOCATION: HEAD

## 2023-09-24 ASSESSMENT — ENCOUNTER SYMPTOMS
ABDOMINAL PAIN: 0
VOMITING: 0
COUGH: 0
SHORTNESS OF BREATH: 0
NAUSEA: 0

## 2023-09-24 ASSESSMENT — PAIN SCALES - GENERAL: PAINLEVEL_OUTOF10: 7

## 2023-09-24 ASSESSMENT — PAIN - FUNCTIONAL ASSESSMENT: PAIN_FUNCTIONAL_ASSESSMENT: NONE - DENIES PAIN

## 2023-09-24 ASSESSMENT — PAIN DESCRIPTION - ORIENTATION: ORIENTATION: LEFT;ANTERIOR;RIGHT

## 2023-09-24 NOTE — ED PROVIDER NOTES
PM           NIH Stroke Scale  Interval: Baseline  Level of Consciousness (1a): Alert  LOC Questions (1b): Answers both correctly  LOC Commands (1c): Performs both tasks correctly  Best Gaze (2): Normal  Visual (3): No visual loss  Facial Palsy (4): Normal symmetrical movement  Motor Arm, Left (5a): No drift  Motor Arm, Right (5b): No drift  Motor Leg, Left (6a): No drift  Motor Leg, Right (6b): No drift  Limb Ataxia (7): Absent  Sensory (8): Normal  Best Language (9): No aphasia  Dysarthria (10): Normal  Extinction and Inattention (11): No abnormality  Total: 0            Voice dictation software was used during the making of this note. This software is not perfect and grammatical and other typographical errors may be present. This note has not been completely proofread for errors.        Saji Lopez, [place]  09/24/23 3591

## 2023-09-24 NOTE — ED TRIAGE NOTES
Pt arrives to ed with family. Pt feels like she has vertigo, stumbles and has trouble ambulating. Nauseated, and has thrown up. X3 days.

## 2023-09-25 NOTE — DISCHARGE INSTRUCTIONS
Drink plenty of fluids and rest.  Use the meclizine as needed for your dizziness and Zofran for nausea. If your symptoms change or worsen in any way, return immediately to the emergency department.

## 2023-10-23 ENCOUNTER — TRANSCRIBE ORDERS (OUTPATIENT)
Dept: SCHEDULING | Age: 53
End: 2023-10-23

## 2023-10-23 DIAGNOSIS — Z12.31 SCREENING MAMMOGRAM FOR HIGH-RISK PATIENT: Primary | ICD-10-CM

## 2023-11-02 ENCOUNTER — HOSPITAL ENCOUNTER (OUTPATIENT)
Dept: MAMMOGRAPHY | Age: 53
Discharge: HOME | End: 2023-11-02
Attending: INTERNAL MEDICINE
Payer: COMMERCIAL

## 2023-11-02 DIAGNOSIS — Z12.31 SCREENING MAMMOGRAM FOR HIGH-RISK PATIENT: ICD-10-CM

## 2023-11-02 PROCEDURE — 77063 BREAST TOMOSYNTHESIS BI: CPT

## 2023-11-16 ENCOUNTER — HOSPITAL ENCOUNTER (OUTPATIENT)
Dept: MAMMOGRAPHY | Age: 53
End: 2023-11-16
Attending: INTERNAL MEDICINE
Payer: COMMERCIAL

## 2023-11-16 ENCOUNTER — HOSPITAL ENCOUNTER (OUTPATIENT)
Dept: MAMMOGRAPHY | Age: 53
Discharge: HOME | End: 2023-11-16
Attending: INTERNAL MEDICINE
Payer: COMMERCIAL

## 2023-11-16 DIAGNOSIS — R92.8 ABNORMAL SCREENING MAMMOGRAM: ICD-10-CM

## 2023-11-16 PROCEDURE — G0279 TOMOSYNTHESIS, MAMMO: HCPCS

## 2023-11-16 PROCEDURE — 76642 ULTRASOUND BREAST LIMITED: CPT

## 2024-01-08 ENCOUNTER — OFFICE VISIT (OUTPATIENT)
Dept: ORTHOPEDIC SURGERY | Age: 54
End: 2024-01-08
Payer: COMMERCIAL

## 2024-01-08 DIAGNOSIS — M17.12 PRIMARY OSTEOARTHRITIS OF LEFT KNEE: Primary | ICD-10-CM

## 2024-01-08 DIAGNOSIS — M17.11 PRIMARY OSTEOARTHRITIS OF RIGHT KNEE: ICD-10-CM

## 2024-01-08 PROCEDURE — 20611 DRAIN/INJ JOINT/BURSA W/US: CPT | Performed by: PHYSICIAN ASSISTANT

## 2024-01-08 RX ORDER — TRIAMCINOLONE ACETONIDE 40 MG/ML
40 INJECTION, SUSPENSION INTRA-ARTICULAR; INTRAMUSCULAR ONCE
Status: COMPLETED | OUTPATIENT
Start: 2024-01-08 | End: 2024-01-08

## 2024-01-08 RX ADMIN — TRIAMCINOLONE ACETONIDE 40 MG: 40 INJECTION, SUSPENSION INTRA-ARTICULAR; INTRAMUSCULAR at 08:26

## 2024-01-08 NOTE — PROGRESS NOTES
examine the joint, ensure adequate needle placement and to decrease the risk of joint aggravation.  The intercondylar notch, retropatellar fat pad, patella tendon, patella and tibia were all visualized.  Pre and post injection ultrasound still images were obtained and placed in the record. Image were obtained with a [employer] ultrasound transducer (model [id number]).    Procedure Note: The BILATERAL knee was prepped with alcohol. Then, under ultrasound guidance, the knee was injected with 1 mL of 1% Lidocaine and 40mg of Depo-Medrol. The patient tolerated the procedure without difficulty.    Disposition: They are to return as needed    CARLOS Suarez

## 2024-03-11 ENCOUNTER — TELEPHONE (OUTPATIENT)
Dept: ORTHOPEDIC SURGERY | Age: 54
End: 2024-03-11

## 2024-03-11 NOTE — TELEPHONE ENCOUNTER
Patient says can't come 4/15 has to work around her work schedule wants to come on 4/8 if possible.

## 2024-04-10 ENCOUNTER — OFFICE VISIT (OUTPATIENT)
Dept: ORTHOPEDIC SURGERY | Age: 54
End: 2024-04-10
Payer: COMMERCIAL

## 2024-04-10 DIAGNOSIS — M17.12 PRIMARY OSTEOARTHRITIS OF LEFT KNEE: Primary | ICD-10-CM

## 2024-04-10 DIAGNOSIS — M17.11 PRIMARY OSTEOARTHRITIS OF RIGHT KNEE: ICD-10-CM

## 2024-04-10 PROCEDURE — 20611 DRAIN/INJ JOINT/BURSA W/US: CPT | Performed by: PHYSICIAN ASSISTANT

## 2024-04-10 RX ORDER — METHYLPREDNISOLONE ACETATE 40 MG/ML
40 INJECTION, SUSPENSION INTRA-ARTICULAR; INTRALESIONAL; INTRAMUSCULAR; SOFT TISSUE ONCE
Status: COMPLETED | OUTPATIENT
Start: 2024-04-10 | End: 2024-04-10

## 2024-04-10 RX ADMIN — METHYLPREDNISOLONE ACETATE 40 MG: 40 INJECTION, SUSPENSION INTRA-ARTICULAR; INTRALESIONAL; INTRAMUSCULAR; SOFT TISSUE at 08:41

## 2024-04-10 NOTE — PROGRESS NOTES
Name: Mari Gregory  YOB: 1970  Gender: female  MRN: [id number]        Current Outpatient Medications:     ondansetron (ZOFRAN) 4 MG tablet, Take 1 tablet by mouth every 8 hours as needed for Nausea or Vomiting, Disp: 30 tablet, Rfl: 0    diclofenac (VOLTAREN) 75 MG EC tablet, Take 1 tablet by mouth 2 times daily, Disp: 60 tablet, Rfl: 0    diclofenac (VOLTAREN) 75 MG EC tablet, Take 1 tablet by mouth 2 times daily, Disp: 60 tablet, Rfl: 0    aspirin 81 MG EC tablet, Take by mouth daily, Disp: , Rfl:     buPROPion (WELLBUTRIN XL) 300 MG extended release tablet, Take 300 mg by mouth, Disp: , Rfl:     diclofenac sodium (VOLTAREN) 1 % GEL, Apply 4 g topically 4 times daily, Disp: , Rfl:     dilTIAZem (TIAZAC) 240 MG extended release capsule, TAKE ONE CAPSULE BY MOUTH EVERY DAY, Disp: , Rfl:     glimepiride (AMARYL) 2 MG tablet, Take 2 mg by mouth, Disp: , Rfl:     HYDROcodone-acetaminophen (NORCO) 7.5-325 MG per tablet, Take 1 tablet by mouth every 6 hours as needed., Disp: , Rfl:     Lidocaine, Anorectal, 5 % CREA, Actual prescription: Lidocaine 5%: Neurontin/gabapentin 5% combined topical cream/gelApply to affected area up to 4 times a day as needed for pain, Disp: , Rfl:     lisinopril-hydroCHLOROthiazide (PRINZIDE;ZESTORETIC) 20-12.5 MG per tablet, Take 1 Tab by mouth two (2) times a day., Disp: , Rfl:     metFORMIN (GLUCOPHAGE-XR) 500 MG extended release tablet, Take 500 mg by mouth 2 times daily, Disp: , Rfl:     pioglitazone (ACTOS) 30 MG tablet, Take 30 mg by mouth daily, Disp: , Rfl:     traMADol (ULTRAM) 50 MG tablet, Take 50 mg by mouth every 8 hours as needed., Disp: , Rfl:   Allergies   Allergen Reactions    Penicillins Anaphylaxis     \"Tolerates ampicillin and keflex without problems\"    Sulfa Antibiotics Swelling       CC: BILATERALknee pain    Impression: BILATERAL Knee DJD    Procedure: Depomedrol(US) injection with US guidance    Radiology Report: Ultrasound guidance was used to

## 2024-06-10 ENCOUNTER — HOSPITAL ENCOUNTER (EMERGENCY)
Age: 54
Discharge: HOME | End: 2024-06-11
Attending: GENERAL PRACTICE
Payer: COMMERCIAL

## 2024-06-10 DIAGNOSIS — K83.8 COMMON BILE DUCT DILATATION: Primary | ICD-10-CM

## 2024-06-10 DIAGNOSIS — R10.11 RIGHT UPPER QUADRANT ABDOMINAL PAIN: ICD-10-CM

## 2024-06-10 DIAGNOSIS — D72.829 LEUKOCYTOSIS, UNSPECIFIED TYPE: ICD-10-CM

## 2024-06-10 PROCEDURE — 80053 COMPREHEN METABOLIC PANEL: CPT

## 2024-06-10 PROCEDURE — 99285 EMERGENCY DEPT VISIT HI MDM: CPT

## 2024-06-10 PROCEDURE — 85025 COMPLETE CBC W/AUTO DIFF WBC: CPT

## 2024-06-10 ASSESSMENT — PAIN SCALES - GENERAL: PAINLEVEL_OUTOF10: 8

## 2024-06-10 ASSESSMENT — PAIN DESCRIPTION - LOCATION: LOCATION: HEAD

## 2024-06-10 ASSESSMENT — PAIN - FUNCTIONAL ASSESSMENT: PAIN_FUNCTIONAL_ASSESSMENT: 0-10

## 2024-06-11 ENCOUNTER — APPOINTMENT (OUTPATIENT)
Dept: CT IMAGING | Age: 54
End: 2024-06-11
Payer: COMMERCIAL

## 2024-06-11 ENCOUNTER — CLINICAL DOCUMENTATION (OUTPATIENT)
Dept: GASTROENTEROLOGY | Age: 54
End: 2024-06-11

## 2024-06-11 ENCOUNTER — HOSPITAL ENCOUNTER (EMERGENCY)
Dept: NUCLEAR MEDICINE | Age: 54
Discharge: HOME | End: 2024-06-14
Payer: COMMERCIAL

## 2024-06-11 ENCOUNTER — APPOINTMENT (OUTPATIENT)
Dept: ULTRASOUND IMAGING | Age: 54
End: 2024-06-11
Payer: COMMERCIAL

## 2024-06-11 VITALS
HEART RATE: 83 BPM | SYSTOLIC BLOOD PRESSURE: 162 MMHG | TEMPERATURE: 97.5 F | DIASTOLIC BLOOD PRESSURE: 83 MMHG | OXYGEN SATURATION: 97 % | BODY MASS INDEX: 53.99 KG/M2 | HEIGHT: 60 IN | RESPIRATION RATE: 16 BRPM | WEIGHT: 275 LBS

## 2024-06-11 LAB
ALBUMIN SERPL-MCNC: 3.6 G/DL (ref 3.5–5)
ALBUMIN/GLOB SERPL: 0.9 (ref 1–1.9)
ALP SERPL-CCNC: 145 U/L (ref 35–104)
ALT SERPL-CCNC: 32 U/L (ref 12–65)
ANION GAP SERPL CALC-SCNC: 10 MMOL/L (ref 9–18)
AST SERPL-CCNC: 25 U/L (ref 15–37)
BASOPHILS # BLD: 0.1 K/UL (ref 0–0.2)
BASOPHILS NFR BLD: 1 % (ref 0–2)
BILIRUB SERPL-MCNC: 0.4 MG/DL (ref 0–1.2)
BUN SERPL-MCNC: 20 MG/DL (ref 6–23)
CALCIUM SERPL-MCNC: 9.7 MG/DL (ref 8.8–10.2)
CHLORIDE SERPL-SCNC: 100 MMOL/L (ref 98–107)
CO2 SERPL-SCNC: 27 MMOL/L (ref 20–28)
CREAT SERPL-MCNC: 1.13 MG/DL (ref 0.6–1.1)
DIFFERENTIAL METHOD BLD: ABNORMAL
EOSINOPHIL # BLD: 0.2 K/UL (ref 0–0.8)
EOSINOPHIL NFR BLD: 2 % (ref 0.5–7.8)
ERYTHROCYTE [DISTWIDTH] IN BLOOD BY AUTOMATED COUNT: 13 % (ref 11.9–14.6)
GLOBULIN SER CALC-MCNC: 3.9 G/DL (ref 2.3–3.5)
GLUCOSE SERPL-MCNC: 354 MG/DL (ref 70–99)
HCT VFR BLD AUTO: 42.7 % (ref 35.8–46.3)
HGB BLD-MCNC: 14.3 G/DL (ref 11.7–15.4)
IMM GRANULOCYTES # BLD AUTO: 0 K/UL (ref 0–0.5)
IMM GRANULOCYTES NFR BLD AUTO: 0 % (ref 0–5)
LIPASE SERPL-CCNC: 21 U/L (ref 13–60)
LYMPHOCYTES # BLD: 4.6 K/UL (ref 0.5–4.6)
LYMPHOCYTES NFR BLD: 35 % (ref 13–44)
MCH RBC QN AUTO: 27 PG (ref 26.1–32.9)
MCHC RBC AUTO-ENTMCNC: 33.5 G/DL (ref 31.4–35)
MCV RBC AUTO: 80.6 FL (ref 82–102)
MONOCYTES # BLD: 0.7 K/UL (ref 0.1–1.3)
MONOCYTES NFR BLD: 5 % (ref 4–12)
NEUTS SEG # BLD: 7.6 K/UL (ref 1.7–8.2)
NEUTS SEG NFR BLD: 58 % (ref 43–78)
NRBC # BLD: 0 K/UL (ref 0–0.2)
PLATELET # BLD AUTO: 326 K/UL (ref 150–450)
PMV BLD AUTO: 9.3 FL (ref 9.4–12.3)
POTASSIUM SERPL-SCNC: 4.2 MMOL/L (ref 3.5–5.1)
PROT SERPL-MCNC: 7.5 G/DL (ref 6.3–8.2)
RBC # BLD AUTO: 5.3 M/UL (ref 4.05–5.2)
SODIUM SERPL-SCNC: 136 MMOL/L (ref 136–145)
WBC # BLD AUTO: 13.2 K/UL (ref 4.3–11.1)

## 2024-06-11 PROCEDURE — 6370000000 HC RX 637 (ALT 250 FOR IP): Performed by: GENERAL PRACTICE

## 2024-06-11 PROCEDURE — 78227 HEPATOBIL SYST IMAGE W/DRUG: CPT

## 2024-06-11 PROCEDURE — 6360000004 HC RX CONTRAST MEDICATION: Performed by: NURSE PRACTITIONER

## 2024-06-11 PROCEDURE — 3430000000 HC RX DIAGNOSTIC RADIOPHARMACEUTICAL: Performed by: NURSE PRACTITIONER

## 2024-06-11 PROCEDURE — 83690 ASSAY OF LIPASE: CPT

## 2024-06-11 PROCEDURE — A9537 TC99M MEBROFENIN: HCPCS | Performed by: NURSE PRACTITIONER

## 2024-06-11 PROCEDURE — 6360000004 HC RX CONTRAST MEDICATION: Performed by: GENERAL PRACTICE

## 2024-06-11 PROCEDURE — 74177 CT ABD & PELVIS W/CONTRAST: CPT

## 2024-06-11 PROCEDURE — 76705 ECHO EXAM OF ABDOMEN: CPT

## 2024-06-11 PROCEDURE — 36415 COLL VENOUS BLD VENIPUNCTURE: CPT

## 2024-06-11 RX ORDER — SINCALIDE 5 UG/5ML
0.02 INJECTION, POWDER, LYOPHILIZED, FOR SOLUTION INTRAVENOUS ONCE
Status: COMPLETED | OUTPATIENT
Start: 2024-06-11 | End: 2024-06-11

## 2024-06-11 RX ORDER — KIT FOR THE PREPARATION OF TECHNETIUM TC 99M MEBROFENIN 45 MG/10ML
5.5 INJECTION, POWDER, LYOPHILIZED, FOR SOLUTION INTRAVENOUS
Status: COMPLETED | OUTPATIENT
Start: 2024-06-11 | End: 2024-06-11

## 2024-06-11 RX ORDER — ACETAMINOPHEN 500 MG
1000 TABLET ORAL
Status: COMPLETED | OUTPATIENT
Start: 2024-06-11 | End: 2024-06-11

## 2024-06-11 RX ADMIN — ACETAMINOPHEN 1000 MG: 500 TABLET, FILM COATED ORAL at 03:31

## 2024-06-11 RX ADMIN — IOPAMIDOL 100 ML: 755 INJECTION, SOLUTION INTRAVENOUS at 02:45

## 2024-06-11 RX ADMIN — SINCALIDE 2.49 MCG: 5 INJECTION, POWDER, LYOPHILIZED, FOR SOLUTION INTRAVENOUS at 15:48

## 2024-06-11 RX ADMIN — MEBROFENIN 5.5 MILLICURIE: 45 INJECTION, POWDER, LYOPHILIZED, FOR SOLUTION INTRAVENOUS at 15:06

## 2024-06-11 ASSESSMENT — PAIN SCALES - GENERAL: PAINLEVEL_OUTOF10: 10

## 2024-06-11 ASSESSMENT — PAIN DESCRIPTION - LOCATION: LOCATION: HEAD

## 2024-06-11 NOTE — PROGRESS NOTES
Hospitalist request GI to see patient; admitted initially for acute on chronic diarrhea/headaches. Only 1x episode of diarrhea since being in the ER last night. Had CT and US showing minimally distended gallbladder and mildly dilated CBD at 7.9 mm with mildly elevated ALP (also elevated in 2020), mild leukocytosis and normal bilirubin/AST/ALT. States that she was not having any pain until after the US, in which she now has dull pain to RUQ. No nausea, vomiting, fevers. Seen by hospitalist and felt not a candidate for admission for MRCP given normal labs and minimal pain, patient declining discharge until seen by GI. Spoke with patient and discussed that she likely would not need admission at this time given only minimally elevated CBD with no evidence of stones and normal labwork, but we are happy to see her in follow up. She is still hesitant for discharge and would like to speak to general surgery regarding her gallbladder. Will attempt to reach out to general surgery to see if they will speak to her.    CARLOS Gallardo

## 2024-06-11 NOTE — ED TRIAGE NOTES
C/o diarrhea all day today, had some abd earlier but has gotten better this evening, denies hx of stomach issues or diarrhea, took imodium with minimal relief, denies nausea and vomiting, also reports HA on right side of head starting about 4 hours ago, does have hx of migraine, reports has had a good bit of stress this weekend,

## 2024-06-11 NOTE — ED PROVIDER NOTES
Emergency Department Provider Note       PCP: Martin Davis MD   Age: 53 y.o.   Sex: female     DISPOSITION Decision To Admit 06/11/2024 07:15:16 AM       ICD-10-CM    1. Common bile duct dilatation  K83.8       2. Leukocytosis, unspecified type  D72.829       3. Right upper quadrant abdominal pain  R10.11           Medical Decision Making     Patient's presentation was somewhat equivocal.  This could be due to her type 2 diabetes and nephropathy and being able to localize her symptoms.  She was stating that she was having some right-sided abdominal pain.  Was mostly pain-free while here in the department but then started to develop some right upper quadrant pain.  CT suggested a distended gallbladder.  Ultrasound showed a distended gallbladder with distended common bile duct.  No obvious gallstones.  No gallbladder wall thickening or pericholecystic fluid.  Radiologist recommending MRCP     1 or more acute illnesses that pose a threat to life or bodily function.   Discussion with external consultants.  Chronic medical problems impacting care include diabetes.  Shared medical decision making was utilized in creating the patients health plan today.    I independently ordered and reviewed each unique test.  I reviewed external records: provider visit note from PCP.  I reviewed external records: previous lab results from outside ED.  I reviewed external records: previous imaging study including radiologist interpretation.     I interpreted the CT Scan CT scan abdomen pelvis shows distended gallbladder.  No bowel obstruction and no perforated viscus and no appendicitis..  I interpreted the Ultrasound  ultrasound of the right upper quadrant shows distended gallbladder and common bile duct dilatation.  No evidence of cholecystitis.  I have reviewed and agree with radiology.    The patient was admitted and I have discussed patient management with the admitting provider.    Exclusion criteria - the patient

## 2024-06-11 NOTE — CONSULTS
[facility] Hospitalist Consult   Admit Date:  6/10/2024 10:55 PM   Name:  Mari Gregory   Age:  53 y.o.  Sex:  female  :  1970   MRN:  [id number]   Room:  ER    Presenting/Chief Complaint: Diarrhea and Headache    Reason(s) for Admission: No admission diagnoses are documented for this encounter.     Hospitalists consulted by Dameon Moore DO for: possible admission    History of Presenting Illness:   Mari Gregory is a 53 y.o. female with history of hypertension, diabetes, migraines who presented with complaints of diarrhea.  Patient reports having diarrhea that started yesterday afternoon.  Had some stomach issues but otherwise denied any significant right upper quadrant pain, nausea, vomiting, fevers, chills.  She was concerned that she was having loose stools and therefore decided to come to the ER for further evaluation.    In the ER, patient had basic workup done.  She had a white blood cell count 13.1 with normal differential.  Creatinine was slightly elevated to 1.2 without baseline labs seen.  No evidence of transaminitis and alk phos was mildly elevated at 140.  CT abdomen pelvis was done which showed possible distended gallbladder and bile ducts.  Right upper quadrant ultrasound was done for further delineation.  Right upper quadrant ultrasound showed minimally distended gallbladder and ducts.    ER physician believes patient should be admitted and therefore consulted hospitalist.    During my interview, patient reports no significant abdominal pain, nausea, vomiting.  Her diarrhea has slowed down.  ER workup shows she only received Tylenol and no IV fluids or any other interventions.  Patient was very agitated and did not want to be admitted to the hospital.  Given no evidence of gallstones on imaging with no correlating clinical symptoms, patient does not need to be admitted for MRCP.        Assessment & Plan:     Diarrhea    Likely has some sort of self-limiting gastroenteritis.  Diarrhea

## 2024-06-11 NOTE — PROGRESS NOTES
HIDA Scan     INDICATION: Right upper quadrant pain, gallbladder distention on ultrasound and  CT     Imaging of the abdomen was performed after intravenous injection of 5.5 mCi  technetium Choletec     FINDINGS: There is normal homogeneous uptake in the liver. Activity is seen in  the biliary system within 10 minutes. Activity is also promptly noted in the  gallbladder and small bowel.     The patient was then given 2.49 mcg of CCK without symptoms.  The gallbladder  ejection fraction is 15% %. There was a negative pain response.     IMPRESSION:  No evidence of gallbladder or bile duct obstruction. Ejection  fraction is low consistent with biliary dyskinesia.    PLAN: Dr Lopez has reviewed HIDA results.  She does not require emergent surgery for her Low EF.  She can follow up as OP with Dr Lopez to discuss elective Cholecystectomy.      LAURE AMAYA, APRN - CNP

## 2024-06-11 NOTE — PROGRESS NOTES
Asked by ED to see patient who came into ED yesterday with a 1 day history of diarrhea and headache.  Diarrhea has resolved, headache continues intermittently.      Imaging with CT a/p yesterday showed:   1.  There is significant gallbladder distention.  A follow-up right  upper quadrant ultrasound be helpful.  2.  The gallbladder is somewhat distended.  A follow-up right upper  quadrant ultrasound would be helpful.  3.  There is fatty infiltration of the liver.  4.  No other acute changes are seen.    US Abdomen 6/11/24:   1. No evidence of cholelithiasis.  However the gallbladder and common  duct are minimally distended.  Distal common duct was not well  visualized.  I would suggest consideration to further evaluation with  MRCP.  2. Hepatomegaly with hepatic steatosis.    Labs reviewed: LFTs wnl with exception of alk phos 145  Blood sugar 354     GI saw the patient as asked.  They are not recommending MRCP but would be happy to see patient as OP.      Pt requesting to see General surgery regarding GB findings on CT and US.      She has generalized abdominal tenderness, no guarding or peritoneal signs.      Recommendations: to definitively rule out gallbladder dysfunction, recommending HIDA scan.  Patient offered she can get as OP with office follow up or get this done today while here.  Patient prefers to get HIDA scan done while here.  Order placed.  Will follow up results.  Dr Lopez reviewed patient notes and images and suggested HIDA scan.      LAURE AMAYA, CHRISTINE - CNP

## 2024-06-11 NOTE — ED NOTES
Patient mobility status  with no difficulty. Provider aware     I have reviewed discharge instructions with the patient.  The patient verbalized understanding.    Patient left ED via Discharge Method: ambulatory to Home   Opportunity for questions and clarification provided.     Patient given 0 scripts.           Winsome Morton RN  06/11/24 1014

## 2024-06-12 NOTE — PROGRESS NOTES
CH received request from Father Clement to check on PT and Spouse. PT was in surgery. RN was updating Spouse. PT should be discharged later today. CH checked for unmet needs and offered support.     . Marycruz Gibson M.Div.

## 2024-07-10 ENCOUNTER — OFFICE VISIT (OUTPATIENT)
Dept: ORTHOPEDIC SURGERY | Age: 54
End: 2024-07-10
Payer: COMMERCIAL

## 2024-07-10 DIAGNOSIS — M17.11 PRIMARY OSTEOARTHRITIS OF RIGHT KNEE: ICD-10-CM

## 2024-07-10 DIAGNOSIS — M17.12 PRIMARY OSTEOARTHRITIS OF LEFT KNEE: Primary | ICD-10-CM

## 2024-07-10 PROCEDURE — 20611 DRAIN/INJ JOINT/BURSA W/US: CPT | Performed by: PHYSICIAN ASSISTANT

## 2024-07-10 RX ORDER — METHYLPREDNISOLONE ACETATE 40 MG/ML
40 INJECTION, SUSPENSION INTRA-ARTICULAR; INTRALESIONAL; INTRAMUSCULAR; SOFT TISSUE ONCE
Status: COMPLETED | OUTPATIENT
Start: 2024-07-10 | End: 2024-07-10

## 2024-07-10 RX ADMIN — METHYLPREDNISOLONE ACETATE 40 MG: 40 INJECTION, SUSPENSION INTRA-ARTICULAR; INTRALESIONAL; INTRAMUSCULAR; SOFT TISSUE at 09:08

## 2024-07-10 RX ADMIN — METHYLPREDNISOLONE ACETATE 40 MG: 40 INJECTION, SUSPENSION INTRA-ARTICULAR; INTRALESIONAL; INTRAMUSCULAR; SOFT TISSUE at 09:09

## 2024-07-10 NOTE — PROGRESS NOTES
Name: Mari Gregory  YOB: 1970  Gender: female  MRN: [id number]        Current Outpatient Medications:     ondansetron (ZOFRAN) 4 MG tablet, Take 1 tablet by mouth every 8 hours as needed for Nausea or Vomiting, Disp: 30 tablet, Rfl: 0    diclofenac (VOLTAREN) 75 MG EC tablet, Take 1 tablet by mouth 2 times daily, Disp: 60 tablet, Rfl: 0    diclofenac (VOLTAREN) 75 MG EC tablet, Take 1 tablet by mouth 2 times daily, Disp: 60 tablet, Rfl: 0    aspirin 81 MG EC tablet, Take by mouth daily, Disp: , Rfl:     buPROPion (WELLBUTRIN XL) 300 MG extended release tablet, Take 300 mg by mouth, Disp: , Rfl:     diclofenac sodium (VOLTAREN) 1 % GEL, Apply 4 g topically 4 times daily, Disp: , Rfl:     dilTIAZem (TIAZAC) 240 MG extended release capsule, TAKE ONE CAPSULE BY MOUTH EVERY DAY, Disp: , Rfl:     glimepiride (AMARYL) 2 MG tablet, Take 2 mg by mouth, Disp: , Rfl:     HYDROcodone-acetaminophen (NORCO) 7.5-325 MG per tablet, Take 1 tablet by mouth every 6 hours as needed., Disp: , Rfl:     Lidocaine, Anorectal, 5 % CREA, Actual prescription: Lidocaine 5%: Neurontin/gabapentin 5% combined topical cream/gelApply to affected area up to 4 times a day as needed for pain, Disp: , Rfl:     lisinopril-hydroCHLOROthiazide (PRINZIDE;ZESTORETIC) 20-12.5 MG per tablet, Take 1 Tab by mouth two (2) times a day., Disp: , Rfl:     metFORMIN (GLUCOPHAGE-XR) 500 MG extended release tablet, Take 500 mg by mouth 2 times daily, Disp: , Rfl:     pioglitazone (ACTOS) 30 MG tablet, Take 30 mg by mouth daily, Disp: , Rfl:     traMADol (ULTRAM) 50 MG tablet, Take 50 mg by mouth every 8 hours as needed., Disp: , Rfl:   Allergies   Allergen Reactions    Penicillins Anaphylaxis     \"Tolerates ampicillin and keflex without problems\"    Sulfa Antibiotics Swelling       CC: BILATERALknee pain    Impression: BILATERAL Knee DJD    The patient's name and date of birth were confirmed prior to the injection.  The injection site was also

## 2024-08-05 ENCOUNTER — HOSPITAL ENCOUNTER (EMERGENCY)
Age: 54
Discharge: HOME | End: 2024-08-05
Attending: EMERGENCY MEDICINE
Payer: COMMERCIAL

## 2024-08-05 ENCOUNTER — APPOINTMENT (OUTPATIENT)
Dept: ULTRASOUND IMAGING | Age: 54
End: 2024-08-05
Payer: COMMERCIAL

## 2024-08-05 ENCOUNTER — APPOINTMENT (OUTPATIENT)
Dept: GENERAL RADIOLOGY | Age: 54
End: 2024-08-05
Payer: COMMERCIAL

## 2024-08-05 VITALS
TEMPERATURE: 97.7 F | OXYGEN SATURATION: 97 % | RESPIRATION RATE: 18 BRPM | DIASTOLIC BLOOD PRESSURE: 86 MMHG | WEIGHT: 293 LBS | SYSTOLIC BLOOD PRESSURE: 137 MMHG | HEIGHT: 60 IN | HEART RATE: 93 BPM | BODY MASS INDEX: 57.52 KG/M2

## 2024-08-05 DIAGNOSIS — L03.116 CELLULITIS OF LEFT LOWER EXTREMITY: ICD-10-CM

## 2024-08-05 DIAGNOSIS — R73.9 HYPERGLYCEMIA: ICD-10-CM

## 2024-08-05 DIAGNOSIS — N30.00 ACUTE CYSTITIS WITHOUT HEMATURIA: ICD-10-CM

## 2024-08-05 DIAGNOSIS — K83.8 BILIARY SLUDGE: ICD-10-CM

## 2024-08-05 DIAGNOSIS — R10.11 ABDOMINAL PAIN, RIGHT UPPER QUADRANT: Primary | ICD-10-CM

## 2024-08-05 LAB
ALBUMIN SERPL-MCNC: 2.9 G/DL (ref 3.5–5)
ALBUMIN/GLOB SERPL: 0.6 (ref 1–1.9)
ALP SERPL-CCNC: 151 U/L (ref 35–104)
ALT SERPL-CCNC: 51 U/L (ref 12–65)
ANION GAP SERPL CALC-SCNC: 14 MMOL/L (ref 9–18)
APPEARANCE UR: ABNORMAL
AST SERPL-CCNC: 33 U/L (ref 15–37)
BACTERIA URNS QL MICRO: ABNORMAL /HPF
BASOPHILS # BLD: 0.1 K/UL (ref 0–0.2)
BASOPHILS NFR BLD: 1 % (ref 0–2)
BILIRUB SERPL-MCNC: 0.7 MG/DL (ref 0–1.2)
BILIRUB UR QL: NEGATIVE
BILIRUB UR QL: NEGATIVE
BUN SERPL-MCNC: 30 MG/DL (ref 6–23)
CALCIUM SERPL-MCNC: 9.5 MG/DL (ref 8.8–10.2)
CHLORIDE SERPL-SCNC: 97 MMOL/L (ref 98–107)
CO2 SERPL-SCNC: 22 MMOL/L (ref 20–28)
COLOR UR: YELLOW
CREAT SERPL-MCNC: 1.27 MG/DL (ref 0.6–1.1)
DIFFERENTIAL METHOD BLD: ABNORMAL
EKG ATRIAL RATE: 107 BPM
EKG DIAGNOSIS: NORMAL
EKG P AXIS: 57 DEGREES
EKG P-R INTERVAL: 150 MS
EKG Q-T INTERVAL: 364 MS
EKG QRS DURATION: 158 MS
EKG QTC CALCULATION (BAZETT): 485 MS
EKG R AXIS: 262 DEGREES
EKG T AXIS: 0 DEGREES
EKG VENTRICULAR RATE: 107 BPM
EOSINOPHIL # BLD: 0.1 K/UL (ref 0–0.8)
EOSINOPHIL NFR BLD: 1 % (ref 0.5–7.8)
EPI CELLS #/AREA URNS HPF: ABNORMAL /HPF
ERYTHROCYTE [DISTWIDTH] IN BLOOD BY AUTOMATED COUNT: 13.1 % (ref 11.9–14.6)
GLOBULIN SER CALC-MCNC: 4.5 G/DL (ref 2.3–3.5)
GLUCOSE BLD STRIP.AUTO-MCNC: 330 MG/DL (ref 65–100)
GLUCOSE BLD STRIP.AUTO-MCNC: 522 MG/DL (ref 65–100)
GLUCOSE SERPL-MCNC: 510 MG/DL (ref 70–99)
GLUCOSE UR QL STRIP.AUTO: >1000 MG/DL
GLUCOSE UR STRIP.AUTO-MCNC: >1000 MG/DL
HCT VFR BLD AUTO: 40.2 % (ref 35.8–46.3)
HGB BLD-MCNC: 13.1 G/DL (ref 11.7–15.4)
HGB UR QL STRIP: ABNORMAL
IMM GRANULOCYTES # BLD AUTO: 0.1 K/UL (ref 0–0.5)
IMM GRANULOCYTES NFR BLD AUTO: 1 % (ref 0–5)
KETONES UR QL STRIP.AUTO: 15 MG/DL
KETONES UR-MCNC: 15 MG/DL
LACTATE SERPL-SCNC: 1.3 MMOL/L (ref 0.5–2)
LEUKOCYTE ESTERASE UR QL STRIP.AUTO: NEGATIVE
LEUKOCYTE ESTERASE UR QL STRIP: NEGATIVE
LIPASE SERPL-CCNC: 31 U/L (ref 13–60)
LYMPHOCYTES # BLD: 1.7 K/UL (ref 0.5–4.6)
LYMPHOCYTES NFR BLD: 11 % (ref 13–44)
MCH RBC QN AUTO: 27.1 PG (ref 26.1–32.9)
MCHC RBC AUTO-ENTMCNC: 32.6 G/DL (ref 31.4–35)
MCV RBC AUTO: 83.1 FL (ref 82–102)
MONOCYTES # BLD: 0.8 K/UL (ref 0.1–1.3)
MONOCYTES NFR BLD: 5 % (ref 4–12)
MUCOUS THREADS URNS QL MICRO: 0 /LPF
NEUTS SEG # BLD: 13.1 K/UL (ref 1.7–8.2)
NEUTS SEG NFR BLD: 81 % (ref 43–78)
NITRITE UR QL STRIP.AUTO: POSITIVE
NITRITE UR QL: POSITIVE
NRBC # BLD: 0 K/UL (ref 0–0.2)
OTHER OBSERVATIONS: ABNORMAL
PH UR STRIP: 6 (ref 5–9)
PH UR: 6 (ref 5–9)
PLATELET # BLD AUTO: 346 K/UL (ref 150–450)
PMV BLD AUTO: 9.5 FL (ref 9.4–12.3)
POTASSIUM SERPL-SCNC: 4.7 MMOL/L (ref 3.5–5.1)
PROT SERPL-MCNC: 7.4 G/DL (ref 6.3–8.2)
PROT UR QL: 30 MG/DL
PROT UR STRIP-MCNC: 30 MG/DL
RBC # BLD AUTO: 4.84 M/UL (ref 4.05–5.2)
RBC # UR STRIP: ABNORMAL
RBC #/AREA URNS HPF: ABNORMAL /HPF
SERVICE CMNT-IMP: ABNORMAL
SODIUM SERPL-SCNC: 133 MMOL/L (ref 136–145)
SP GR UR REFRACTOMETRY: 1.01 (ref 1–1.02)
SP GR UR: 1.01 (ref 1–1.02)
UROBILINOGEN UR QL STRIP.AUTO: 0.2 EU/DL (ref 0.2–1)
UROBILINOGEN UR QL: 0.2 EU/DL (ref 0.2–1)
WBC # BLD AUTO: 15.9 K/UL (ref 4.3–11.1)
WBC URNS QL MICRO: ABNORMAL /HPF
YEAST URNS QL MICRO: ABNORMAL

## 2024-08-05 PROCEDURE — 93005 ELECTROCARDIOGRAM TRACING: CPT | Performed by: EMERGENCY MEDICINE

## 2024-08-05 PROCEDURE — 80053 COMPREHEN METABOLIC PANEL: CPT

## 2024-08-05 PROCEDURE — 82962 GLUCOSE BLOOD TEST: CPT

## 2024-08-05 PROCEDURE — 81003 URINALYSIS AUTO W/O SCOPE: CPT

## 2024-08-05 PROCEDURE — 6370000000 HC RX 637 (ALT 250 FOR IP): Performed by: EMERGENCY MEDICINE

## 2024-08-05 PROCEDURE — 83605 ASSAY OF LACTIC ACID: CPT

## 2024-08-05 PROCEDURE — 93010 ELECTROCARDIOGRAM REPORT: CPT | Performed by: INTERNAL MEDICINE

## 2024-08-05 PROCEDURE — 71046 X-RAY EXAM CHEST 2 VIEWS: CPT

## 2024-08-05 PROCEDURE — 93971 EXTREMITY STUDY: CPT

## 2024-08-05 PROCEDURE — 85025 COMPLETE CBC W/AUTO DIFF WBC: CPT

## 2024-08-05 PROCEDURE — 81001 URINALYSIS AUTO W/SCOPE: CPT

## 2024-08-05 PROCEDURE — 76705 ECHO EXAM OF ABDOMEN: CPT

## 2024-08-05 PROCEDURE — 99285 EMERGENCY DEPT VISIT HI MDM: CPT

## 2024-08-05 PROCEDURE — 83690 ASSAY OF LIPASE: CPT

## 2024-08-05 PROCEDURE — 2580000003 HC RX 258: Performed by: EMERGENCY MEDICINE

## 2024-08-05 RX ORDER — CEPHALEXIN 500 MG/1
500 CAPSULE ORAL 3 TIMES DAILY
Qty: 30 CAPSULE | Refills: 0 | Status: SHIPPED | OUTPATIENT
Start: 2024-08-05 | End: 2024-08-15

## 2024-08-05 RX ORDER — 0.9 % SODIUM CHLORIDE 0.9 %
1000 INTRAVENOUS SOLUTION INTRAVENOUS
Status: COMPLETED | OUTPATIENT
Start: 2024-08-05 | End: 2024-08-05

## 2024-08-05 RX ORDER — CLINDAMYCIN HYDROCHLORIDE 150 MG/1
300 CAPSULE ORAL 3 TIMES DAILY
Qty: 60 CAPSULE | Refills: 0 | Status: SHIPPED | OUTPATIENT
Start: 2024-08-05 | End: 2024-08-15

## 2024-08-05 RX ORDER — FLUCONAZOLE 150 MG/1
150 TABLET ORAL ONCE
Qty: 1 TABLET | Refills: 0 | Status: SHIPPED | OUTPATIENT
Start: 2024-08-05 | End: 2024-08-05

## 2024-08-05 RX ADMIN — SODIUM CHLORIDE 1000 ML: 9 INJECTION, SOLUTION INTRAVENOUS at 10:32

## 2024-08-05 RX ADMIN — SODIUM CHLORIDE 1000 ML: 9 INJECTION, SOLUTION INTRAVENOUS at 07:59

## 2024-08-05 RX ADMIN — INSULIN HUMAN 10 UNITS: 100 INJECTION, SOLUTION PARENTERAL at 10:32

## 2024-08-05 ASSESSMENT — ENCOUNTER SYMPTOMS
NAUSEA: 1
VOMITING: 0
COUGH: 0
COLOR CHANGE: 0
RHINORRHEA: 0
ABDOMINAL PAIN: 1
SHORTNESS OF BREATH: 0
SORE THROAT: 0
CONSTIPATION: 0
DIARRHEA: 1
BACK PAIN: 0

## 2024-08-05 ASSESSMENT — PAIN SCALES - GENERAL
PAINLEVEL_OUTOF10: 7
PAINLEVEL_OUTOF10: 9

## 2024-08-05 ASSESSMENT — PAIN DESCRIPTION - ORIENTATION: ORIENTATION: RIGHT

## 2024-08-05 ASSESSMENT — PAIN DESCRIPTION - LOCATION: LOCATION: FLANK

## 2024-08-05 ASSESSMENT — PAIN - FUNCTIONAL ASSESSMENT: PAIN_FUNCTIONAL_ASSESSMENT: 0-10

## 2024-08-05 NOTE — ED PROVIDER NOTES
LeotyTanyaHARRIETN    EKG 12 Lead   Result Value Ref Range    Ventricular Rate 107 BPM    Atrial Rate 107 BPM    P-R Interval 150 ms    QRS Duration 158 ms    Q-T Interval 364 ms    QTc Calculation (Bazett) 485 ms    P Axis 57 degrees    R Axis 262 degrees    T Axis 0 degrees    Diagnosis       Sinus tachycardia  Right bundle branch block  Possible  Inferior infarct , age undetermined  Possible  Anterolateral infarct , age undetermined  Abnormal ECG  No previous ECGs available  Confirmed by MD MARY (), DAVIS ([id number]) on 8/5/2024 9:59:42 AM     Vascular duplex lower extremity venous left    Narrative    Left lower extremity venous ultrasound    INDICATION:  Pain and swelling,    COMPARISON:  None    Doppler ultrasound of the left lower extremity was performed.    FINDINGS:  There is normal flow in the great saphenous, common femoral, femoral,  and popliteal veins. Normal compression and augmentation is demonstrated. The  proximal calf veins are also patent.      Impression    No evidence of deep venous thrombosis in the left lower extremity      Electronically signed by Kylah Mathis         XR CHEST (2 VW)   Final Result   No acute findings in the chest         Electronically signed by Kylah Mathis      US ABDOMEN LIMITED   Final Result      Hepatomegaly and hepatic steatosis.      Dilated common bile duct. Recommend MRCP.      Layering biliary sludge         Electronically signed by Kylah Mathis      Vascular duplex lower extremity venous left   Final Result   No evidence of deep venous thrombosis in the left lower extremity         Electronically signed by Kylah Mathis                   No results for input(s): \"COVID19\" in the last 72 hours.    Voice dictation software was used during the making of this note.  This software is not perfect and grammatical and other typographical errors may be present.  This note has not been completely proofread for errors.     Colby Mora III, MD  08/05/24 6255

## 2024-08-05 NOTE — ED TRIAGE NOTES
Pt reports not being able to eat due to constant diarrhea since the beginning of July when she was seen here for issues with her gallbladder.  Pt was told gallbladder was severely enlarged and is not functioning properly.  Pt states insurance will not cover removal due to lack of infection.  Pt reports right-sided flank pain.  Pt reports left sided calf pain with redness and swelling that started Saturday morning.

## 2024-08-05 NOTE — ED NOTES
Patient mobility status  with no difficulty. Provider aware     I have reviewed discharge instructions with the patient.  The patient verbalized understanding.    Patient left ED via Discharge Method: wheelchair to Home with Spouse.    Opportunity for questions and clarification provided.     Patient given 4 scripts.            Kimberly Pedroza RN  08/05/24 6529

## 2024-08-06 ENCOUNTER — PREP FOR PROCEDURE (OUTPATIENT)
Dept: GASTROENTEROLOGY | Age: 54
End: 2024-08-06

## 2024-08-06 ENCOUNTER — OFFICE VISIT (OUTPATIENT)
Dept: GASTROENTEROLOGY | Age: 54
End: 2024-08-06
Payer: COMMERCIAL

## 2024-08-06 VITALS
DIASTOLIC BLOOD PRESSURE: 94 MMHG | HEART RATE: 95 BPM | RESPIRATION RATE: 18 BRPM | OXYGEN SATURATION: 93 % | WEIGHT: 293 LBS | BODY MASS INDEX: 58.59 KG/M2 | SYSTOLIC BLOOD PRESSURE: 163 MMHG

## 2024-08-06 DIAGNOSIS — Z12.11 ENCOUNTER FOR SCREENING COLONOSCOPY: ICD-10-CM

## 2024-08-06 DIAGNOSIS — R10.11 ABDOMINAL PAIN, ACUTE, RIGHT UPPER QUADRANT: Primary | ICD-10-CM

## 2024-08-06 DIAGNOSIS — R10.11 ABDOMINAL PAIN, ACUTE, RIGHT UPPER QUADRANT: ICD-10-CM

## 2024-08-06 PROCEDURE — 3080F DIAST BP >= 90 MM HG: CPT | Performed by: STUDENT IN AN ORGANIZED HEALTH CARE EDUCATION/TRAINING PROGRAM

## 2024-08-06 PROCEDURE — 3077F SYST BP >= 140 MM HG: CPT | Performed by: STUDENT IN AN ORGANIZED HEALTH CARE EDUCATION/TRAINING PROGRAM

## 2024-08-06 PROCEDURE — 99204 OFFICE O/P NEW MOD 45 MIN: CPT | Performed by: STUDENT IN AN ORGANIZED HEALTH CARE EDUCATION/TRAINING PROGRAM

## 2024-08-06 RX ORDER — SODIUM CHLORIDE 0.9 % (FLUSH) 0.9 %
5-40 SYRINGE (ML) INJECTION PRN
Status: CANCELLED | OUTPATIENT
Start: 2024-08-06

## 2024-08-06 RX ORDER — SODIUM CHLORIDE 0.9 % (FLUSH) 0.9 %
5-40 SYRINGE (ML) INJECTION EVERY 12 HOURS SCHEDULED
Status: CANCELLED | OUTPATIENT
Start: 2024-08-06

## 2024-08-06 RX ORDER — SODIUM CHLORIDE 9 MG/ML
25 INJECTION, SOLUTION INTRAVENOUS PRN
Status: CANCELLED | OUTPATIENT
Start: 2024-08-06

## 2024-08-06 NOTE — PROGRESS NOTES
Mari Gregory is 53 y.o. y/o female here for initial evaluation.     Referral  Diagnosis   K83.8 (ICD-10-CM) - Biliary sludge     US ABDOMEN LIMITED 8/5/2024  FINDINGS:  -LIVER: 25 cm. Increased echogenicity  -BILE DUCTS: No intrahepatic bile duct dilatation.  CBD diameter = 6.6 mm.  -GALLBLADDER: Layered sludge.  -PANCREAS: Normal.  -RIGHT KIDNEY: 13 cm.  No mass or hydronephrosis.  -ABDOMINAL AORTA AND IVC: Normal in size.  -ASCITES: No free fluid.  IMPRESSION:  Hepatomegaly and hepatic steatosis.  Dilated common bile duct. Recommend MRCP.  Layering biliary sludge    Progress note 6/11/2024 Demario Stein PA   Hospitalist request GI to see patient; admitted initially for acute on chronic diarrhea/headaches. Only 1x episode of diarrhea since being in the ER last night. Had CT and US showing minimally distended gallbladder and mildly dilated CBD at 7.9 mm with mildly elevated ALP (also elevated in 2020), mild leukocytosis and normal bilirubin/AST/ALT. States that she was not having any pain until after the US, in which she now has dull pain to RUQ. No nausea, vomiting, fevers. Seen by hospitalist and felt not a candidate for admission for MRCP given normal labs and minimal pain, patient declining discharge until seen by GI. Spoke with patient and discussed that she likely would not need admission at this time given only minimally elevated CBD with no evidence of stones and normal labwork, but we are happy to see her in follow up. She is still hesitant for discharge and would like to speak to general surgery regarding her gallbladder. Will attempt to reach out to general surgery to see if they will speak to her.     Today:   She reports having 1 bowel with every 2 to 3 days, now having chronic nausea, with right upper quadrant ongoing pain.  Pain is postprandial at this point, no prior colonoscopy history, mother had colon cancer.     Lab Results   Component Value Date    HGB 13.1 08/05/2024    WBC 15.9 (H)

## 2024-08-09 RX ORDER — SODIUM CHLORIDE 0.9 % (FLUSH) 0.9 %
5-40 SYRINGE (ML) INJECTION PRN
Status: CANCELLED | OUTPATIENT
Start: 2024-08-09

## 2024-08-09 RX ORDER — SODIUM CHLORIDE 9 MG/ML
25 INJECTION, SOLUTION INTRAVENOUS PRN
Status: CANCELLED | OUTPATIENT
Start: 2024-08-09

## 2024-08-09 RX ORDER — SODIUM CHLORIDE 0.9 % (FLUSH) 0.9 %
5-40 SYRINGE (ML) INJECTION EVERY 12 HOURS SCHEDULED
Status: CANCELLED | OUTPATIENT
Start: 2024-08-09

## 2024-09-05 PROBLEM — Z12.11 ENCOUNTER FOR SCREENING COLONOSCOPY: Status: RESOLVED | Noted: 2024-08-06 | Resolved: 2024-09-05

## 2024-10-14 ENCOUNTER — OFFICE VISIT (OUTPATIENT)
Dept: ORTHOPEDIC SURGERY | Age: 54
End: 2024-10-14
Payer: COMMERCIAL

## 2024-10-14 DIAGNOSIS — M17.11 PRIMARY OSTEOARTHRITIS OF RIGHT KNEE: Primary | ICD-10-CM

## 2024-10-14 DIAGNOSIS — M17.12 PRIMARY OSTEOARTHRITIS OF LEFT KNEE: ICD-10-CM

## 2024-10-14 PROCEDURE — 20611 DRAIN/INJ JOINT/BURSA W/US: CPT | Performed by: PHYSICIAN ASSISTANT

## 2024-10-14 RX ORDER — METHYLPREDNISOLONE ACETATE 40 MG/ML
40 INJECTION, SUSPENSION INTRA-ARTICULAR; INTRALESIONAL; INTRAMUSCULAR; SOFT TISSUE ONCE
Status: COMPLETED | OUTPATIENT
Start: 2024-10-14 | End: 2024-10-14

## 2024-10-14 RX ADMIN — METHYLPREDNISOLONE ACETATE 40 MG: 40 INJECTION, SUSPENSION INTRA-ARTICULAR; INTRALESIONAL; INTRAMUSCULAR; SOFT TISSUE at 08:19

## 2024-10-14 NOTE — PROGRESS NOTES
Name: Mari Gregory  YOB: 1970  Gender: female  MRN: [id number]        Current Outpatient Medications:     metFORMIN (GLUCOPHAGE) 500 MG tablet, Take 1 tablet by mouth 2 times daily (with meals) (Patient not taking: Reported on 8/6/2024), Disp: 180 tablet, Rfl: 1    ondansetron (ZOFRAN) 4 MG tablet, Take 1 tablet by mouth every 8 hours as needed for Nausea or Vomiting, Disp: 30 tablet, Rfl: 0    diclofenac (VOLTAREN) 75 MG EC tablet, Take 1 tablet by mouth 2 times daily, Disp: 60 tablet, Rfl: 0    diclofenac (VOLTAREN) 75 MG EC tablet, Take 1 tablet by mouth 2 times daily, Disp: 60 tablet, Rfl: 0    aspirin 81 MG EC tablet, Take by mouth daily (Patient not taking: Reported on 8/6/2024), Disp: , Rfl:     buPROPion (WELLBUTRIN XL) 300 MG extended release tablet, Take 300 mg by mouth (Patient not taking: Reported on 8/6/2024), Disp: , Rfl:     diclofenac sodium (VOLTAREN) 1 % GEL, Apply 4 g topically 4 times daily, Disp: , Rfl:     dilTIAZem (TIAZAC) 240 MG extended release capsule, TAKE ONE CAPSULE BY MOUTH EVERY DAY, Disp: , Rfl:     glimepiride (AMARYL) 2 MG tablet, Take 2 mg by mouth, Disp: , Rfl:     HYDROcodone-acetaminophen (NORCO) 7.5-325 MG per tablet, Take 1 tablet by mouth every 6 hours as needed., Disp: , Rfl:     Lidocaine, Anorectal, 5 % CREA, Actual prescription: Lidocaine 5%: Neurontin/gabapentin 5% combined topical cream/gelApply to affected area up to 4 times a day as needed for pain, Disp: , Rfl:     lisinopril-hydroCHLOROthiazide (PRINZIDE;ZESTORETIC) 20-12.5 MG per tablet, Take 1 Tab by mouth two (2) times a day., Disp: , Rfl:     pioglitazone (ACTOS) 30 MG tablet, Take 30 mg by mouth daily, Disp: , Rfl:     traMADol (ULTRAM) 50 MG tablet, Take 50 mg by mouth every 8 hours as needed., Disp: , Rfl:   Allergies   Allergen Reactions    Penicillins Anaphylaxis     \"Tolerates ampicillin and keflex without problems\"    Sulfa Antibiotics Swelling       CC: BILATERALknee

## 2024-10-29 ENCOUNTER — TELEPHONE (OUTPATIENT)
Dept: ORTHOPEDIC SURGERY | Age: 54
End: 2024-10-29

## 2024-10-29 NOTE — TELEPHONE ENCOUNTER
Tabitha is calling to see if Dr. Short would start the prior auth for a motorized wheelchair since he mentioned in his note that one would benefit her. You would do it through [employer] and their number is [phone]

## 2024-11-01 ENCOUNTER — TRANSCRIBE ORDERS (OUTPATIENT)
Dept: SCHEDULING | Age: 54
End: 2024-11-01

## 2024-11-01 DIAGNOSIS — Z12.31 ENCOUNTER FOR SCREENING MAMMOGRAM FOR HIGH-RISK PATIENT: Primary | ICD-10-CM

## 2024-12-16 ENCOUNTER — TELEPHONE (OUTPATIENT)
Dept: ORTHOPEDIC SURGERY | Age: 54
End: 2024-12-16

## 2025-01-20 ENCOUNTER — OFFICE VISIT (OUTPATIENT)
Dept: ORTHOPEDIC SURGERY | Age: 55
End: 2025-01-20
Payer: COMMERCIAL

## 2025-01-20 DIAGNOSIS — M17.11 PRIMARY OSTEOARTHRITIS OF RIGHT KNEE: Primary | ICD-10-CM

## 2025-01-20 DIAGNOSIS — M17.12 PRIMARY OSTEOARTHRITIS OF LEFT KNEE: ICD-10-CM

## 2025-01-20 PROCEDURE — 20611 DRAIN/INJ JOINT/BURSA W/US: CPT | Performed by: PHYSICIAN ASSISTANT

## 2025-01-20 RX ORDER — METHYLPREDNISOLONE ACETATE 40 MG/ML
40 INJECTION, SUSPENSION INTRA-ARTICULAR; INTRALESIONAL; INTRAMUSCULAR; SOFT TISSUE ONCE
Status: COMPLETED | OUTPATIENT
Start: 2025-01-20 | End: 2025-01-20

## 2025-01-20 RX ADMIN — METHYLPREDNISOLONE ACETATE 40 MG: 40 INJECTION, SUSPENSION INTRA-ARTICULAR; INTRALESIONAL; INTRAMUSCULAR; SOFT TISSUE at 08:39

## 2025-01-20 RX ADMIN — METHYLPREDNISOLONE ACETATE 40 MG: 40 INJECTION, SUSPENSION INTRA-ARTICULAR; INTRALESIONAL; INTRAMUSCULAR; SOFT TISSUE at 08:40

## 2025-01-20 NOTE — PROGRESS NOTES
Name: Mari Gregory  YOB: 1970  Gender: female  MRN: [id number]        Current Outpatient Medications:     metFORMIN (GLUCOPHAGE) 500 MG tablet, Take 1 tablet by mouth 2 times daily (with meals) (Patient not taking: Reported on 8/6/2024), Disp: 180 tablet, Rfl: 1    ondansetron (ZOFRAN) 4 MG tablet, Take 1 tablet by mouth every 8 hours as needed for Nausea or Vomiting, Disp: 30 tablet, Rfl: 0    diclofenac (VOLTAREN) 75 MG EC tablet, Take 1 tablet by mouth 2 times daily, Disp: 60 tablet, Rfl: 0    diclofenac (VOLTAREN) 75 MG EC tablet, Take 1 tablet by mouth 2 times daily, Disp: 60 tablet, Rfl: 0    aspirin 81 MG EC tablet, Take by mouth daily (Patient not taking: Reported on 8/6/2024), Disp: , Rfl:     buPROPion (WELLBUTRIN XL) 300 MG extended release tablet, Take 300 mg by mouth (Patient not taking: Reported on 8/6/2024), Disp: , Rfl:     diclofenac sodium (VOLTAREN) 1 % GEL, Apply 4 g topically 4 times daily, Disp: , Rfl:     dilTIAZem (TIAZAC) 240 MG extended release capsule, TAKE ONE CAPSULE BY MOUTH EVERY DAY, Disp: , Rfl:     glimepiride (AMARYL) 2 MG tablet, Take 2 mg by mouth, Disp: , Rfl:     HYDROcodone-acetaminophen (NORCO) 7.5-325 MG per tablet, Take 1 tablet by mouth every 6 hours as needed., Disp: , Rfl:     Lidocaine, Anorectal, 5 % CREA, Actual prescription: Lidocaine 5%: Neurontin/gabapentin 5% combined topical cream/gelApply to affected area up to 4 times a day as needed for pain, Disp: , Rfl:     lisinopril-hydroCHLOROthiazide (PRINZIDE;ZESTORETIC) 20-12.5 MG per tablet, Take 1 Tab by mouth two (2) times a day., Disp: , Rfl:     pioglitazone (ACTOS) 30 MG tablet, Take 30 mg by mouth daily, Disp: , Rfl:     traMADol (ULTRAM) 50 MG tablet, Take 50 mg by mouth every 8 hours as needed., Disp: , Rfl:   Allergies   Allergen Reactions    Penicillins Anaphylaxis     \"Tolerates ampicillin and keflex without problems\"    Sulfa Antibiotics Swelling       CC: BILATERALknee

## 2025-04-18 ENCOUNTER — TELEPHONE (OUTPATIENT)
Dept: ORTHOPEDIC SURGERY | Age: 55
End: 2025-04-18

## 2025-04-28 ENCOUNTER — OFFICE VISIT (OUTPATIENT)
Dept: ORTHOPEDIC SURGERY | Age: 55
End: 2025-04-28
Payer: COMMERCIAL

## 2025-04-28 DIAGNOSIS — M17.11 PRIMARY OSTEOARTHRITIS OF RIGHT KNEE: Primary | ICD-10-CM

## 2025-04-28 DIAGNOSIS — M17.12 PRIMARY OSTEOARTHRITIS OF LEFT KNEE: ICD-10-CM

## 2025-04-28 PROCEDURE — 20611 DRAIN/INJ JOINT/BURSA W/US: CPT | Performed by: PHYSICIAN ASSISTANT

## 2025-04-28 RX ORDER — METHYLPREDNISOLONE ACETATE 40 MG/ML
40 INJECTION, SUSPENSION INTRA-ARTICULAR; INTRALESIONAL; INTRAMUSCULAR; SOFT TISSUE ONCE
Status: COMPLETED | OUTPATIENT
Start: 2025-04-28 | End: 2025-04-28

## 2025-04-28 RX ADMIN — METHYLPREDNISOLONE ACETATE 40 MG: 40 INJECTION, SUSPENSION INTRA-ARTICULAR; INTRALESIONAL; INTRAMUSCULAR; SOFT TISSUE at 08:28

## 2025-04-28 NOTE — PROGRESS NOTES
Name: Mari Gregory  YOB: 1970  Gender: female  MRN: [id number]        Current Outpatient Medications:     metFORMIN (GLUCOPHAGE) 500 MG tablet, Take 1 tablet by mouth 2 times daily (with meals) (Patient not taking: Reported on 8/6/2024), Disp: 180 tablet, Rfl: 1    ondansetron (ZOFRAN) 4 MG tablet, Take 1 tablet by mouth every 8 hours as needed for Nausea or Vomiting, Disp: 30 tablet, Rfl: 0    diclofenac (VOLTAREN) 75 MG EC tablet, Take 1 tablet by mouth 2 times daily, Disp: 60 tablet, Rfl: 0    diclofenac (VOLTAREN) 75 MG EC tablet, Take 1 tablet by mouth 2 times daily, Disp: 60 tablet, Rfl: 0    aspirin 81 MG EC tablet, Take by mouth daily (Patient not taking: Reported on 8/6/2024), Disp: , Rfl:     buPROPion (WELLBUTRIN XL) 300 MG extended release tablet, Take 300 mg by mouth (Patient not taking: Reported on 8/6/2024), Disp: , Rfl:     diclofenac sodium (VOLTAREN) 1 % GEL, Apply 4 g topically 4 times daily, Disp: , Rfl:     dilTIAZem (TIAZAC) 240 MG extended release capsule, TAKE ONE CAPSULE BY MOUTH EVERY DAY, Disp: , Rfl:     glimepiride (AMARYL) 2 MG tablet, Take 2 mg by mouth, Disp: , Rfl:     HYDROcodone-acetaminophen (NORCO) 7.5-325 MG per tablet, Take 1 tablet by mouth every 6 hours as needed., Disp: , Rfl:     Lidocaine, Anorectal, 5 % CREA, Actual prescription: Lidocaine 5%: Neurontin/gabapentin 5% combined topical cream/gelApply to affected area up to 4 times a day as needed for pain, Disp: , Rfl:     lisinopril-hydroCHLOROthiazide (PRINZIDE;ZESTORETIC) 20-12.5 MG per tablet, Take 1 Tab by mouth two (2) times a day., Disp: , Rfl:     pioglitazone (ACTOS) 30 MG tablet, Take 30 mg by mouth daily, Disp: , Rfl:     traMADol (ULTRAM) 50 MG tablet, Take 50 mg by mouth every 8 hours as needed., Disp: , Rfl:   Allergies   Allergen Reactions    Penicillins Anaphylaxis     \"Tolerates ampicillin and keflex without problems\"    Sulfa Antibiotics Swelling       CC: BILATERALknee

## 2025-07-28 ENCOUNTER — OFFICE VISIT (OUTPATIENT)
Dept: ORTHOPEDIC SURGERY | Age: 55
End: 2025-07-28

## 2025-07-28 DIAGNOSIS — M17.12 PRIMARY OSTEOARTHRITIS OF LEFT KNEE: ICD-10-CM

## 2025-07-28 DIAGNOSIS — M17.11 PRIMARY OSTEOARTHRITIS OF RIGHT KNEE: Primary | ICD-10-CM

## 2025-07-28 RX ORDER — METHYLPREDNISOLONE ACETATE 40 MG/ML
40 INJECTION, SUSPENSION INTRA-ARTICULAR; INTRALESIONAL; INTRAMUSCULAR; SOFT TISSUE ONCE
Status: COMPLETED | OUTPATIENT
Start: 2025-07-28 | End: 2025-07-28

## 2025-07-28 RX ADMIN — METHYLPREDNISOLONE ACETATE 40 MG: 40 INJECTION, SUSPENSION INTRA-ARTICULAR; INTRALESIONAL; INTRAMUSCULAR; SOFT TISSUE at 08:23

## 2025-07-28 NOTE — PROGRESS NOTES
Name: Mari Gregory  YOB: 1970  Gender: female  MRN: [id number]        Current Outpatient Medications:     metFORMIN (GLUCOPHAGE) 500 MG tablet, Take 1 tablet by mouth 2 times daily (with meals) (Patient not taking: Reported on 8/6/2024), Disp: 180 tablet, Rfl: 1    ondansetron (ZOFRAN) 4 MG tablet, Take 1 tablet by mouth every 8 hours as needed for Nausea or Vomiting, Disp: 30 tablet, Rfl: 0    diclofenac (VOLTAREN) 75 MG EC tablet, Take 1 tablet by mouth 2 times daily, Disp: 60 tablet, Rfl: 0    diclofenac (VOLTAREN) 75 MG EC tablet, Take 1 tablet by mouth 2 times daily, Disp: 60 tablet, Rfl: 0    aspirin 81 MG EC tablet, Take by mouth daily (Patient not taking: Reported on 8/6/2024), Disp: , Rfl:     buPROPion (WELLBUTRIN XL) 300 MG extended release tablet, Take 300 mg by mouth (Patient not taking: Reported on 8/6/2024), Disp: , Rfl:     diclofenac sodium (VOLTAREN) 1 % GEL, Apply 4 g topically 4 times daily, Disp: , Rfl:     dilTIAZem (TIAZAC) 240 MG extended release capsule, TAKE ONE CAPSULE BY MOUTH EVERY DAY, Disp: , Rfl:     glimepiride (AMARYL) 2 MG tablet, Take 2 mg by mouth, Disp: , Rfl:     HYDROcodone-acetaminophen (NORCO) 7.5-325 MG per tablet, Take 1 tablet by mouth every 6 hours as needed., Disp: , Rfl:     Lidocaine, Anorectal, 5 % CREA, Actual prescription: Lidocaine 5%: Neurontin/gabapentin 5% combined topical cream/gelApply to affected area up to 4 times a day as needed for pain, Disp: , Rfl:     lisinopril-hydroCHLOROthiazide (PRINZIDE;ZESTORETIC) 20-12.5 MG per tablet, Take 1 Tab by mouth two (2) times a day., Disp: , Rfl:     pioglitazone (ACTOS) 30 MG tablet, Take 30 mg by mouth daily, Disp: , Rfl:     traMADol (ULTRAM) 50 MG tablet, Take 50 mg by mouth every 8 hours as needed., Disp: , Rfl:   Allergies   Allergen Reactions    Penicillins Anaphylaxis     \"Tolerates ampicillin and keflex without problems\"    Sulfa Antibiotics Swelling       CC: BILATERALknee pain    The